# Patient Record
Sex: MALE | Race: WHITE | NOT HISPANIC OR LATINO | URBAN - NONMETROPOLITAN AREA
[De-identification: names, ages, dates, MRNs, and addresses within clinical notes are randomized per-mention and may not be internally consistent; named-entity substitution may affect disease eponyms.]

---

## 2019-12-18 ENCOUNTER — TRANSCRIBE ORDERS (OUTPATIENT)
Dept: ADMINISTRATIVE | Facility: HOSPITAL | Age: 49
End: 2019-12-18

## 2019-12-18 DIAGNOSIS — M47.817 SPONDYLOSIS WITHOUT MYELOPATHY OR RADICULOPATHY, LUMBOSACRAL REGION: Primary | ICD-10-CM

## 2019-12-27 ENCOUNTER — APPOINTMENT (OUTPATIENT)
Dept: CT IMAGING | Facility: HOSPITAL | Age: 49
End: 2019-12-27

## 2020-01-06 ENCOUNTER — HOSPITAL ENCOUNTER (OUTPATIENT)
Dept: CT IMAGING | Facility: HOSPITAL | Age: 50
Discharge: HOME | End: 2020-01-06
Admitting: PAIN MEDICINE

## 2020-01-06 DIAGNOSIS — M47.817 SPONDYLOSIS WITHOUT MYELOPATHY OR RADICULOPATHY, LUMBOSACRAL REGION: ICD-10-CM

## 2020-01-06 PROCEDURE — 72131 CT LUMBAR SPINE W/O DYE: CPT | Performed by: RADIOLOGY

## 2020-01-06 PROCEDURE — 72131 CT LUMBAR SPINE W/O DYE: CPT

## 2020-02-24 ENCOUNTER — OFFICE VISIT (OUTPATIENT)
Dept: CARDIOLOGY | Facility: CLINIC | Age: 50
End: 2020-02-24

## 2020-02-24 VITALS
HEART RATE: 89 BPM | WEIGHT: 115 LBS | HEIGHT: 69 IN | BODY MASS INDEX: 17.03 KG/M2 | SYSTOLIC BLOOD PRESSURE: 168 MMHG | DIASTOLIC BLOOD PRESSURE: 90 MMHG

## 2020-02-24 DIAGNOSIS — R42 DIZZINESS: ICD-10-CM

## 2020-02-24 DIAGNOSIS — R01.1 MURMUR, CARDIAC: ICD-10-CM

## 2020-02-24 DIAGNOSIS — R06.02 SHORTNESS OF BREATH: ICD-10-CM

## 2020-02-24 DIAGNOSIS — F17.200 SMOKING: ICD-10-CM

## 2020-02-24 DIAGNOSIS — R07.89 CHEST PRESSURE: ICD-10-CM

## 2020-02-24 DIAGNOSIS — R09.89 BRUIT OF RIGHT CAROTID ARTERY: ICD-10-CM

## 2020-02-24 DIAGNOSIS — I10 ESSENTIAL HYPERTENSION: Primary | ICD-10-CM

## 2020-02-24 PROBLEM — IMO0001 SMOKING: Status: ACTIVE | Noted: 2020-02-24

## 2020-02-24 PROCEDURE — 93000 ELECTROCARDIOGRAM COMPLETE: CPT | Performed by: INTERNAL MEDICINE

## 2020-02-24 PROCEDURE — 99204 OFFICE O/P NEW MOD 45 MIN: CPT | Performed by: INTERNAL MEDICINE

## 2020-02-24 RX ORDER — LOSARTAN POTASSIUM 25 MG/1
25 TABLET ORAL DAILY
Qty: 30 TABLET | Refills: 3 | Status: SHIPPED | OUTPATIENT
Start: 2020-02-24 | End: 2020-03-05 | Stop reason: DRUGHIGH

## 2020-02-24 RX ORDER — HYDROCODONE BITARTRATE AND ACETAMINOPHEN 5; 325 MG/1; MG/1
1 TABLET ORAL EVERY 6 HOURS PRN
COMMUNITY
End: 2021-09-21 | Stop reason: ALTCHOICE

## 2020-02-24 RX ORDER — ERGOCALCIFEROL 1.25 MG/1
50000 CAPSULE ORAL WEEKLY
COMMUNITY
End: 2022-05-09 | Stop reason: ALTCHOICE

## 2020-02-24 NOTE — PROGRESS NOTES
Chief Complaint   Patient presents with   • Establish Care     HTN   • Hypertension     BP started going up about 2-3 months ago, was 2/4 it was 209/138 at the pain clinic,  reports PCP has started 4-5 medications but due to side effects he has not been able to take them.  Reports amlodipine, metoprolol and Lisinopril caused severe headaches and chlorthalidone made him dizzy.  Pt did not want them listed in allergys, would be willing to try again if you feel necessary.    • Shortness of Breath     with minimal exertion   • Chest Pain     exertional, mid chest, becomes SOB, symptoms resolve after resting.    • Aspirin     does not take a daily aspirin   • Labs     requesting most recent labs   • Cardiac history     none        CARDIAC COMPLAINTS  chest pressure/discomfort, dyspnea and Dizziness      Subjective   Fredrick Henao is a 49 y.o. male came in today for his initial cardiac evaluation.  He has history of hypertension diagnosed about 3 to 4 months ago.  He has been having a lot of problem with his back and neck and was seen at the pain clinic.  His systolic blood pressure was more than 200 and his diastolic was more than 130.  He was seen at your office and multiple medication has been tried which includes amlodipine, metoprolol, lisinopril which apparently caused him to have headaches.  He was also tried on chlorthalidone which made him dizzy.  He is now referred for evaluation of his blood pressure and also the other symptoms he is having.  He apparently has been having chest pain in the form of pressure-like feeling in the mid part of the chest which occurs mostly on exertion and occasionally at rest.  The symptoms do get better when he takes some rest.  He also has been noticing shortness of breath which initially was occurring on moderate exertion now occurring and minimal exertion.  It was not associated with any orthopnea or cough.  He also has been having episodes of dizziness and lightheadedness and  occasional vision changes.  The chest pressure is not associated with any nausea or vomiting.  It is not associated with any palpitation or diaphoresis.  He did undergo some lab work and found to have slightly low folic acid level.  His blood count was normal with a normal MCV.  His TSH was normal with a normal T4.  His cholesterol is normal at 122 with a triglyceride of 149 and LDL of 65.  He used to be a 2 pack cigarettes a day and is now cutting down to about a pack a pack and a half a day.  His father  in his 50's with cancer and ischemic heart disease.  His mother  earlier and she did have diabetes.  1 of her sister  of a cancer the other sister has premature coronary artery disease.    History reviewed. No pertinent surgical history.    Current Outpatient Medications   Medication Sig Dispense Refill   • Fluticasone Furoate-Vilanterol (BREO ELLIPTA) 100-25 MCG/INH inhaler Inhale 1 puff Daily.     • HYDROcodone-acetaminophen (NORCO) 5-325 MG per tablet Take 1 tablet by mouth Every 6 (Six) Hours As Needed.     • Umeclidinium Bromide (INCRUSE ELLIPTA) 62.5 MCG/INH aerosol powder  Inhale.     • vitamin D (ERGOCALCIFEROL) 1.25 MG (38622 UT) capsule capsule Take 50,000 Units by mouth 1 (One) Time Per Week.     • losartan (COZAAR) 25 MG tablet Take 1 tablet by mouth Daily. 30 tablet 3   • nicotine (NICOTROL) 10 MG inhaler Inhale 1 puff As Needed for Smoking Cessation. 168 inhaler 4     No current facility-administered medications for this visit.            ALLERGIES:  Other    Past Medical History:   Diagnosis Date   • COPD (chronic obstructive pulmonary disease) (CMS/[facility])    • Heart murmur    • History of ear surgery    • Hypertension    • Leg fracture     s/p steel daniela, then later removed       Social History     Tobacco Use   Smoking Status Current Every Day Smoker   • Packs/day: 1.50   • Years: 38.00   • Pack years: 57.00   • Types: Cigarettes   Smokeless Tobacco Never Used          Family History  "  Problem Relation Age of Onset   • Diabetes Mother    • Cancer Father    • Heart attack Father         MI at early age,  at 55 with cancer, MI prior to t   • Heart attack Sister         MI at 40, s/p stenting   • No Known Problems Brother    • Heart disease Sister         open heart due to \" hole in her heart   • Cancer Sister    • No Known Problems Sister    • No Known Problems Sister    • No Known Problems Daughter    • No Known Problems Daughter    • No Known Problems Son        Review of Systems   Constitution: Positive for malaise/fatigue. Negative for decreased appetite.   HENT: Negative for congestion and sore throat.    Eyes: Negative for blurred vision.   Cardiovascular: Positive for chest pain and dyspnea on exertion.   Respiratory: Positive for shortness of breath. Negative for snoring.    Endocrine: Negative for cold intolerance and heat intolerance.   Hematologic/Lymphatic: Negative for adenopathy. Does not bruise/bleed easily.   Skin: Negative for itching, nail changes and skin cancer.   Musculoskeletal: Positive for back pain. Negative for arthritis and myalgias.   Gastrointestinal: Negative for abdominal pain, dysphagia and heartburn.   Genitourinary: Negative for bladder incontinence and frequency.   Neurological: Positive for dizziness and light-headedness. Negative for seizures and vertigo.   Psychiatric/Behavioral: Negative for altered mental status.   Allergic/Immunologic: Negative for environmental allergies and hives.       Diabetes- No  Thyroid- normal    Objective     /90 (BP Location: Left arm)   Pulse 89   Ht 175.3 cm (69\")   Wt 52.2 kg (115 lb)   BMI 16.98 kg/m²     Physical Exam   Constitutional: He is oriented to person, place, and time. He appears well-developed and well-nourished.   HENT:   Head: Normocephalic.   Nose: Nose normal.   Eyes: Pupils are equal, round, and reactive to light. EOM are normal.   Neck: Normal range of motion. Neck supple.   Cardiovascular: " Normal rate, regular rhythm, S1 normal and S2 normal.   Murmur heard.  Pulmonary/Chest: Effort normal and breath sounds normal.   Abdominal: Soft. Bowel sounds are normal.   Musculoskeletal: Normal range of motion. He exhibits no edema.   Neurological: He is alert and oriented to person, place, and time.   Skin: Skin is warm and dry.   Psychiatric: He has a normal mood and affect.         ECG 12 Lead  Date/Time: 2/24/2020 2:37 PM  Performed by: Faisal Meza MD  Authorized by: Faisal Meza MD   Previous ECG: no previous ECG available  Rhythm: sinus rhythm  Rate: normal  QRS axis: normal  Other findings: left ventricular hypertrophy and early repolarization    Clinical impression: non-specific ECG              Assessment/Plan   Patient's Body mass index is 16.98 kg/m². BMI is within normal parameters. No follow-up required..     Fredrick was seen today for establish care, hypertension, shortness of breath, chest pain, aspirin, labs and cardiac history.    Diagnoses and all orders for this visit:    Essential hypertension  -     losartan (COZAAR) 25 MG tablet; Take 1 tablet by mouth Daily.    Shortness of breath  -     Adult Transthoracic Echo Complete W/ Cont if Necessary Per Protocol; Future    Chest pressure  -     Stress Test With Myocardial Perfusion One Day; Future    Smoking  -     nicotine (NICOTROL) 10 MG inhaler; Inhale 1 puff As Needed for Smoking Cessation.    Murmur, cardiac    Bruit of right carotid artery  -     US Carotid Bilateral; Future    Dizziness  -     US Carotid Bilateral; Future    At baseline his heart rate is upper limit of normal.  His blood pressure is elevated.  His EKG done today shows normal sinus rhythm, LVH with nonspecific ST changes.  His clinical examination reveals a BMI of 17.  He apparently has been maintaining the same weight for a long time.  His cardiovascular examination is unremarkable other than slightly loud second heart sound and a short systolic murmur at  the mitral area.  He does have a carotid bruit on the right side.    Regarding his blood pressure, he has already tried ACE inhibitor's, calcium channel blockers as well as the beta-blockers and a diuretic.  I will try with an ARB.  I started him on a low-dose of Cozaar at 25 mg once a day.  If he is tolerating it well and then will increase the dose.    Regarding the chest tightness, he does have multiple risk factor for coronary artery disease.  It also could be related to his elevated blood pressure.  I scheduled him to undergo a stress test.  Since he is not able to walk much secondary to his back problem, will do it as a Lexiscan Cardiolite.    Regarding his shortness of breath, it could be related to his smoking but I cannot rule out cardiac.  I scheduled him to have an echocardiogram to evaluate the LV function, valvular structures as well as the PA pressure.    Regarding his smoking history, I had a long talk with him about the increased risk of developing malignancy, vascular disease as well as COPD.  He is willing to try to quit smoking.  I gave him prescription for Nicotrol inhalers and also gave him papers regarding smoking cessation    Regarding the bruit in the right carotid, I scheduled him to undergo a carotid ultrasound.    The dizziness he has could be related to the blood pressure but also could be related to the carotid abnormality.  Based on the ultrasound, further recommendation will be made.                   Electronically signed by Faisal Meza MD February 24, 2020 2:23 PM

## 2020-02-24 NOTE — PROGRESS NOTES
Fredrick Henao  reports that he has been smoking cigarettes. He has a 57.00 pack-year smoking history. He has never used smokeless tobacco.. I have educated him on the risk of diseases from using tobacco products such as cancer, COPD and heart diease.     I advised him to quit and he is willing to quit. We have discussed the following method/s for tobacco cessation:  Education Material Counseling Prescription Medicaiton.  Together we have set a quit date for 1 week from today.  He will follow up with me in 6 months or sooner to check on his progress.    I spent 3  minutes counseling the patient.

## 2020-03-03 ENCOUNTER — HOSPITAL ENCOUNTER (OUTPATIENT)
Dept: CARDIOLOGY | Facility: HOSPITAL | Age: 50
Discharge: HOME | End: 2020-03-03

## 2020-03-03 ENCOUNTER — HOSPITAL ENCOUNTER (OUTPATIENT)
Dept: CARDIOLOGY | Facility: HOSPITAL | Age: 50
End: 2020-03-03

## 2020-03-03 ENCOUNTER — APPOINTMENT (OUTPATIENT)
Dept: CARDIOLOGY | Facility: HOSPITAL | Age: 50
End: 2020-03-03

## 2020-03-03 DIAGNOSIS — R42 DIZZINESS: ICD-10-CM

## 2020-03-03 DIAGNOSIS — R09.89 BRUIT OF RIGHT CAROTID ARTERY: ICD-10-CM

## 2020-03-03 DIAGNOSIS — R06.02 SHORTNESS OF BREATH: ICD-10-CM

## 2020-03-03 LAB
BH CV ECHO MEAS - ACS: 2.2 CM
BH CV ECHO MEAS - AO MAX PG: 3.9 MMHG
BH CV ECHO MEAS - AO MEAN PG: 2 MMHG
BH CV ECHO MEAS - AO ROOT AREA (BSA CORRECTED): 1.9
BH CV ECHO MEAS - AO ROOT AREA: 7.3 CM^2
BH CV ECHO MEAS - AO ROOT DIAM: 3.1 CM
BH CV ECHO MEAS - AO V2 MAX: 99 CM/SEC
BH CV ECHO MEAS - AO V2 MEAN: 73.7 CM/SEC
BH CV ECHO MEAS - AO V2 VTI: 24 CM
BH CV ECHO MEAS - BSA(HAYCOCK): 1.6 M^2
BH CV ECHO MEAS - BSA: 1.6 M^2
BH CV ECHO MEAS - BZI_BMI: 17 KILOGRAMS/M^2
BH CV ECHO MEAS - BZI_METRIC_HEIGHT: 175.3 CM
BH CV ECHO MEAS - BZI_METRIC_WEIGHT: 52.2 KG
BH CV ECHO MEAS - EDV(CUBED): 65 ML
BH CV ECHO MEAS - EDV(MOD-SP4): 86.6 ML
BH CV ECHO MEAS - EDV(TEICH): 70.8 ML
BH CV ECHO MEAS - EF(CUBED): 63.6 %
BH CV ECHO MEAS - EF(MOD-SP4): 56 %
BH CV ECHO MEAS - EF(TEICH): 55.7 %
BH CV ECHO MEAS - ESV(CUBED): 23.6 ML
BH CV ECHO MEAS - ESV(MOD-SP4): 38.1 ML
BH CV ECHO MEAS - ESV(TEICH): 31.4 ML
BH CV ECHO MEAS - FS: 28.6 %
BH CV ECHO MEAS - IVS/LVPW: 0.61
BH CV ECHO MEAS - IVSD: 1 CM
BH CV ECHO MEAS - LA DIMENSION: 2.8 CM
BH CV ECHO MEAS - LA/AO: 0.92
BH CV ECHO MEAS - LV DIASTOLIC VOL/BSA (35-75): 53 ML/M^2
BH CV ECHO MEAS - LV IVRT: 0.11 SEC
BH CV ECHO MEAS - LV MASS(C)D: 192.2 GRAMS
BH CV ECHO MEAS - LV MASS(C)DI: 117.7 GRAMS/M^2
BH CV ECHO MEAS - LV SYSTOLIC VOL/BSA (12-30): 23.3 ML/M^2
BH CV ECHO MEAS - LVIDD: 4 CM
BH CV ECHO MEAS - LVIDS: 2.9 CM
BH CV ECHO MEAS - LVLD AP4: 7.5 CM
BH CV ECHO MEAS - LVLS AP4: 6.2 CM
BH CV ECHO MEAS - LVOT AREA (M): 2.8 CM^2
BH CV ECHO MEAS - LVOT AREA: 2.8 CM^2
BH CV ECHO MEAS - LVOT DIAM: 1.9 CM
BH CV ECHO MEAS - LVPWD: 1.6 CM
BH CV ECHO MEAS - MV A MAX VEL: 67.3 CM/SEC
BH CV ECHO MEAS - MV DEC SLOPE: 400.5 CM/SEC^2
BH CV ECHO MEAS - MV E MAX VEL: 88.3 CM/SEC
BH CV ECHO MEAS - MV E/A: 1.3
BH CV ECHO MEAS - MV MAX PG: 3.6 MMHG
BH CV ECHO MEAS - MV MEAN PG: 2 MMHG
BH CV ECHO MEAS - MV P1/2T MAX VEL: 100 CM/SEC
BH CV ECHO MEAS - MV P1/2T: 73.1 MSEC
BH CV ECHO MEAS - MV V2 MAX: 94.7 CM/SEC
BH CV ECHO MEAS - MV V2 MEAN: 63.3 CM/SEC
BH CV ECHO MEAS - MV V2 VTI: 26.4 CM
BH CV ECHO MEAS - MVA P1/2T LCG: 2.2 CM^2
BH CV ECHO MEAS - MVA(P1/2T): 3 CM^2
BH CV ECHO MEAS - RVDD: 2.1 CM
BH CV ECHO MEAS - SI(AO): 107.4 ML/M^2
BH CV ECHO MEAS - SI(CUBED): 25.3 ML/M^2
BH CV ECHO MEAS - SI(MOD-SP4): 29.7 ML/M^2
BH CV ECHO MEAS - SI(TEICH): 24.2 ML/M^2
BH CV ECHO MEAS - SV(AO): 175.3 ML
BH CV ECHO MEAS - SV(CUBED): 41.3 ML
BH CV ECHO MEAS - SV(MOD-SP4): 48.5 ML
BH CV ECHO MEAS - SV(TEICH): 39.4 ML
MAXIMAL PREDICTED HEART RATE: 173 BPM
STRESS TARGET HR: 147 BPM

## 2020-03-03 PROCEDURE — 93306 TTE W/DOPPLER COMPLETE: CPT

## 2020-03-03 PROCEDURE — 93880 EXTRACRANIAL BILAT STUDY: CPT

## 2020-03-03 PROCEDURE — 93306 TTE W/DOPPLER COMPLETE: CPT | Performed by: INTERNAL MEDICINE

## 2020-03-03 PROCEDURE — 93880 EXTRACRANIAL BILAT STUDY: CPT | Performed by: RADIOLOGY

## 2020-03-04 ENCOUNTER — TELEPHONE (OUTPATIENT)
Dept: CARDIOLOGY | Facility: CLINIC | Age: 50
End: 2020-03-04

## 2020-03-04 NOTE — TELEPHONE ENCOUNTER
Pt called, BP is still running high, 176/126, pulse 83-95. Losartan started at 25 mg daily to see how he tolerates it at his consult. Reports no problems with the medication just not controlling the BP.

## 2020-03-05 RX ORDER — LOSARTAN POTASSIUM 50 MG/1
50 TABLET ORAL 2 TIMES DAILY
Qty: 180 TABLET | Refills: 3 | Status: SHIPPED | OUTPATIENT
Start: 2020-03-05 | End: 2020-03-16 | Stop reason: ALTCHOICE

## 2020-03-05 NOTE — TELEPHONE ENCOUNTER
Pt aware to take Losartan 50 mg BID, continue to monitor BP and call if still not controlled. Sending script to [facility].

## 2020-03-05 NOTE — TELEPHONE ENCOUNTER
Pt's wife called, pt went to the ER with chest pain last night. Reports BP was 186/126. Gave him Nitro and told him to call us. I have advised his wife to increase the Losartan to 50 mg daily. Is there any other changes you want to make? ER report in chart for you to review.

## 2020-03-10 ENCOUNTER — TELEPHONE (OUTPATIENT)
Dept: CARDIOLOGY | Facility: CLINIC | Age: 50
End: 2020-03-10

## 2020-03-10 NOTE — TELEPHONE ENCOUNTER
Pt's wife called, reports BP still running high. 170's/100 at times, didn't report pulse.  Pt had his stress and echo today. Advised her that I would let you know so you could address when reading stress and echo.     Currently taking losartan 50 mg BID.

## 2020-03-16 RX ORDER — OLMESARTAN MEDOXOMIL 40 MG/1
40 TABLET ORAL DAILY
Qty: 30 TABLET | Refills: 3 | Status: SHIPPED | OUTPATIENT
Start: 2020-03-16 | End: 2020-04-16 | Stop reason: ALTCHOICE

## 2020-03-16 NOTE — TELEPHONE ENCOUNTER
I called Sharon to check on his stress that pt assured me he went to on the 10th and walked on the treadmill. She said pt was not there on the 10th. I called his wife back to ask about it, she was certain he was there for the stress. I again called our [facility] back, spoke with  and Heena, they both assured me that he was not there on the 10th. He was there on the 3rd and had to be rescheduled due to being a Riri and having had caffine. I have left a message for his wife to return my call. Regardless of stress or not his BP is still elevated per wife. 190/119 at times.  Pulse not reported. Currently on losartan 50 mg twice a day, can we adjust or add something until we can figure out the stress test?

## 2020-03-16 NOTE — TELEPHONE ENCOUNTER
Pt's wife Dulce aware of medication changes to stop Losartan and start Benicar 40 mg daily (Clarified instructions with Dr Meza, Benicar 40 mg daily).. Also advised her we have still been unable to obtain any results of the stress she and pt reported him having on 3/10,  that after talking again to Jeanne at our [facility], there is nothing showing that he was ever there on 3/10 or had the stress. Advised her to call the [facility] at [phone] to discuss with them. She wanted medication sent to [facility]. Advised her to call back if BP still not controlled.

## 2020-03-17 ENCOUNTER — PRIOR AUTHORIZATION (OUTPATIENT)
Dept: CARDIOLOGY | Facility: CLINIC | Age: 50
End: 2020-03-17

## 2020-04-16 ENCOUNTER — TELEPHONE (OUTPATIENT)
Dept: CARDIOLOGY | Facility: CLINIC | Age: 50
End: 2020-04-16

## 2020-04-16 RX ORDER — CANDESARTAN 32 MG/1
32 TABLET ORAL DAILY
Qty: 30 TABLET | Refills: 3 | Status: SHIPPED | OUTPATIENT
Start: 2020-04-16 | End: 2020-08-12 | Stop reason: ALTCHOICE

## 2020-04-16 NOTE — TELEPHONE ENCOUNTER
Noticed in chart denial for Benicar due to not a preferred drug for health plan. Preferred medications Candesartan, irbesartan, telmisartan, and valsartan.    Do you want to change to one of the preferred medications, if so what medication and dose? Thank you

## 2020-04-16 NOTE — TELEPHONE ENCOUNTER
PATIENT CALLED IN STATING THAT THE MEDICATION PRIOR AUTH WAS NOT COMPLETE.  LOOKS AS THOUGH [facility] HAD FAXED IN FORMS.

## 2020-04-16 NOTE — TELEPHONE ENCOUNTER
Patient made aware to stop Benicar, will start Candesartan 32mg daily, patient verbalized understanding.

## 2020-06-05 RX ORDER — OLMESARTAN MEDOXOMIL 40 MG/1
TABLET ORAL
Qty: 30 TABLET | Refills: 2 | OUTPATIENT
Start: 2020-06-05

## 2020-07-14 RX ORDER — OLMESARTAN MEDOXOMIL 40 MG/1
TABLET ORAL
Qty: 30 TABLET | OUTPATIENT
Start: 2020-07-14

## 2020-08-11 ENCOUNTER — TELEPHONE (OUTPATIENT)
Dept: CARDIOLOGY | Facility: CLINIC | Age: 50
End: 2020-08-11

## 2020-08-11 RX ORDER — OLMESARTAN MEDOXOMIL 40 MG/1
TABLET ORAL
Qty: 30 TABLET | Refills: 0 | OUTPATIENT
Start: 2020-08-11

## 2020-08-12 RX ORDER — OLMESARTAN MEDOXOMIL 40 MG/1
40 TABLET ORAL DAILY
Qty: 90 TABLET | Refills: 3 | Status: SHIPPED | OUTPATIENT
Start: 2020-08-12 | End: 2020-08-13 | Stop reason: SDUPTHER

## 2020-08-13 RX ORDER — OLMESARTAN MEDOXOMIL 40 MG/1
TABLET ORAL
Qty: 90 TABLET | Refills: 3
Start: 2020-08-13 | End: 2021-09-10 | Stop reason: SDUPTHER

## 2021-08-27 RX ORDER — OLMESARTAN MEDOXOMIL 40 MG/1
TABLET ORAL
Qty: 30 TABLET | Refills: 3 | OUTPATIENT
Start: 2021-08-27

## 2021-08-31 RX ORDER — OLMESARTAN MEDOXOMIL 40 MG/1
TABLET ORAL
Qty: 30 TABLET | OUTPATIENT
Start: 2021-08-31

## 2021-09-07 DIAGNOSIS — I10 ESSENTIAL HYPERTENSION: Primary | ICD-10-CM

## 2021-09-07 RX ORDER — OLMESARTAN MEDOXOMIL 40 MG/1
40 TABLET ORAL DAILY
Qty: 30 TABLET | Refills: 0
Start: 2021-09-07

## 2021-09-10 NOTE — TELEPHONE ENCOUNTER
Script for olmesartan 40 mg daily pended to send to [facility] to get patient to appointment on 09/21/21.

## 2021-09-13 RX ORDER — OLMESARTAN MEDOXOMIL 40 MG/1
40 TABLET ORAL DAILY
Qty: 14 TABLET | Refills: 0 | Status: SHIPPED | OUTPATIENT
Start: 2021-09-13 | End: 2021-09-21 | Stop reason: SDUPTHER

## 2021-09-21 ENCOUNTER — OFFICE VISIT (OUTPATIENT)
Dept: CARDIOLOGY | Facility: CLINIC | Age: 51
End: 2021-09-21

## 2021-09-21 VITALS
TEMPERATURE: 98.4 F | BODY MASS INDEX: 16.74 KG/M2 | SYSTOLIC BLOOD PRESSURE: 124 MMHG | WEIGHT: 113 LBS | HEIGHT: 69 IN | HEART RATE: 80 BPM | DIASTOLIC BLOOD PRESSURE: 70 MMHG

## 2021-09-21 DIAGNOSIS — I10 ESSENTIAL HYPERTENSION: ICD-10-CM

## 2021-09-21 DIAGNOSIS — I20.8 ANGINAL EQUIVALENT (HCC): ICD-10-CM

## 2021-09-21 DIAGNOSIS — Z82.49 FAMILY HISTORY OF ISCHEMIC HEART DISEASE (IHD): ICD-10-CM

## 2021-09-21 DIAGNOSIS — J44.9 COPD MIXED TYPE (HCC): ICD-10-CM

## 2021-09-21 DIAGNOSIS — R06.02 SHORTNESS OF BREATH: ICD-10-CM

## 2021-09-21 DIAGNOSIS — Z72.0 TOBACCO ABUSE: ICD-10-CM

## 2021-09-21 DIAGNOSIS — Z79.899 LONG-TERM USE OF HIGH-RISK MEDICATION: ICD-10-CM

## 2021-09-21 DIAGNOSIS — E55.9 VITAMIN D DEFICIENCY: Primary | ICD-10-CM

## 2021-09-21 PROCEDURE — 99214 OFFICE O/P EST MOD 30 MIN: CPT | Performed by: NURSE PRACTITIONER

## 2021-09-21 RX ORDER — GABAPENTIN 300 MG/1
300 CAPSULE ORAL 2 TIMES DAILY
COMMUNITY
End: 2022-05-09 | Stop reason: ALTCHOICE

## 2021-09-21 RX ORDER — OLMESARTAN MEDOXOMIL 40 MG/1
40 TABLET ORAL DAILY
Qty: 30 TABLET | Refills: 1 | Status: SHIPPED | OUTPATIENT
Start: 2021-09-21 | End: 2021-10-22

## 2021-09-21 RX ORDER — HYDROCODONE BITARTRATE AND ACETAMINOPHEN 7.5; 325 MG/1; MG/1
1 TABLET ORAL 3 TIMES DAILY
COMMUNITY
End: 2022-05-09 | Stop reason: ALTCHOICE

## 2021-09-21 NOTE — PROGRESS NOTES
Chief Complaint   Patient presents with   • Follow-up     For cardiac management. Patient is not on aspirin. Last lab work unknown, states that he probably has not had since 2019. States that he does have shortness of breath with walking.    • Med Refill     Needs refills on cardiac medication. 90 day supplies to [facility]. Went over medications verbally.        Cardiac Complaints  dyspnea      Subjective   Fredrick Henao is a 51 y.o. male with HTN, COPD, and murmur. He was sent to PCP about 3-4 months prior to his consult and his BP was elevated at more than 200 SBP and . He was advised at consult to have stress testing, echo,  and carotid US.  Carotid US showed no lesions bilaterally, echo showed normal LV function with no significant valve concerns and stress was not completed. It appears after consult, he had a great deal of issues with HTN and medication was increased and changed multiple times, lastly on candesartan 32mg daily, then later changed to benicar.    He returns today for follow up and denies any new concerns. He does have SOA with exertion, no worse than prior. No chest pain, dizziness, syncope, palpitations, or falls reported.  He can not recall why he did not complete his stress testing. Labs have not been done for sometime, probably since 2019.  Refills requested.  He unfortunately still smokes despite concerns.          Cardiac History  Past Surgical History:   Procedure Laterality Date   • ECHO - CONVERTED  03/03/2020    EF 60%. Mild MR.   • US CAROTID UNILATERAL  03/03/2020    No sig lesions       Current Outpatient Medications   Medication Sig Dispense Refill   • Fluticasone Furoate-Vilanterol (BREO ELLIPTA) 100-25 MCG/INH inhaler Inhale 1 puff Daily.     • gabapentin (NEURONTIN) 300 MG capsule Take 300 mg by mouth 2 (two) times a day.     • HYDROcodone-acetaminophen (NORCO) 7.5-325 MG per tablet Take 1 tablet by mouth 3 (Three) Times a Day.     • olmesartan (BENICAR) 40 MG  "tablet Take 1 tablet by mouth Daily. 30 tablet 1   • Umeclidinium Bromide (INCRUSE ELLIPTA) 62.5 MCG/INH aerosol powder  Inhale.     • vitamin D (ERGOCALCIFEROL) 1.25 MG (78878 UT) capsule capsule Take 50,000 Units by mouth 1 (One) Time Per Week.       No current facility-administered medications for this visit.       Other    Past Medical History:   Diagnosis Date   • COPD (chronic obstructive pulmonary disease) (CMS/[facility])    • Heart murmur    • History of ear surgery    • Hypertension    • Leg fracture     s/p steel daniela, then later removed       Social History     Socioeconomic History   • Marital status:      Spouse name: Not on file   • Number of children: Not on file   • Years of education: Not on file   • Highest education level: Not on file   Tobacco Use   • Smoking status: Current Every Day Smoker     Packs/day: 1.00     Years: 38.00     Pack years: 38.00     Types: Cigarettes   • Smokeless tobacco: Never Used   Vaping Use   • Vaping Use: Never used   Substance and Sexual Activity   • Alcohol use: Never   • Drug use: Never       Family History   Problem Relation Age of Onset   • Diabetes Mother    • Cancer Father    • Heart attack Father         MI at early age,  at 55 with cancer, MI prior to t   • Heart attack Sister         MI at 40, s/p stenting   • No Known Problems Brother    • Heart disease Sister         open heart due to \" hole in her heart   • Cancer Sister    • No Known Problems Sister    • No Known Problems Sister    • No Known Problems Daughter    • No Known Problems Daughter    • No Known Problems Son        Review of Systems   Constitutional: Negative for malaise/fatigue and night sweats.   Cardiovascular: Positive for dyspnea on exertion. Negative for chest pain, claudication, irregular heartbeat, leg swelling, near-syncope, orthopnea, palpitations and syncope.   Respiratory: Positive for shortness of breath. Negative for cough and wheezing.    Musculoskeletal: Negative for back " "pain, joint pain and stiffness.   Gastrointestinal: Negative for anorexia, heartburn, melena and nausea.   Genitourinary: Negative for dysuria, hematuria, hesitancy and nocturia.   Neurological: Negative for dizziness, light-headedness and loss of balance.   Psychiatric/Behavioral: Negative for depression and memory loss. The patient is not nervous/anxious.            Objective     /70 (BP Location: Left arm)   Pulse 80   Temp 98.4 °F (36.9 °C)   Ht 175.3 cm (69.02\")   Wt 51.3 kg (113 lb)   BMI 16.68 kg/m²     Constitutional:       Appearance: Not in distress.   Eyes:      Pupils: Pupils are equal, round, and reactive to light.   HENT:      Nose: Nose normal.   Pulmonary:      Effort: Pulmonary effort is normal.      Breath sounds: Normal breath sounds.   Cardiovascular:      PMI at left midclavicular line. Normal rate. Regular rhythm.      Murmurs: There is a systolic murmur.   Abdominal:      Palpations: Abdomen is soft.   Musculoskeletal: Normal range of motion.      Cervical back: Normal range of motion and neck supple. Skin:     General: Skin is warm and dry.   Neurological:      Mental Status: Oriented to person, place and time.         Procedures    Assessment/Plan     HTN:  Blood pressure stable. He has tolerated his benicar well. He states with his medication it has been well managed. Limited sodium advised.     Cardiac status:  Stable. He does have SOA but no worse than prior. He did complete an echo, but for reasons unknown he did not complete stress. We will advise once again for this to be done as he has a very strong family history of IHD, risk factors for IHD, and continued tobacco abuse.  Stress will be advised as lexiscan as he has COPD and does not have good exercise tolerance.  More recommendations to follow.    COPD mixed type:  Followed by pulmonary.  He is on inhaler therapy, he admits it has been awhile since he has seen them, compliance urged.     Tobacco abuse:  Still smoking " despite concerns. He is not ready to quit at present.     Lab order provided as it has been over 2 years since he had bloodwork.    Refills per request.    6 month follow up advised or sooner if needed.    BMI remains low at 16.68, he states this has been low weight his whole life. Adequate protein intake advised.        Problems Addressed this Visit        Cardiac and Vasculature    Essential hypertension    Relevant Medications    olmesartan (BENICAR) 40 MG tablet    Other Relevant Orders    CBC (No Diff)    Comprehensive Metabolic Panel    TSH    Lipid Panel       Pulmonary and Pneumonias    Shortness of breath    Relevant Orders    Stress Test With Myocardial Perfusion One Day      Other Visit Diagnoses     Vitamin D deficiency    -  Primary    Relevant Orders    Comprehensive Metabolic Panel    TSH    Lipid Panel    Vitamin D 25 Hydroxy    COPD mixed type (CMS/HCC)        Relevant Orders    Stress Test With Myocardial Perfusion One Day    Long-term use of high-risk medication        Relevant Orders    CBC (No Diff)    Comprehensive Metabolic Panel    TSH    Lipid Panel    Anginal equivalent (CMS/HCC)        Relevant Orders    Stress Test With Myocardial Perfusion One Day    Family history of ischemic heart disease (IHD)        Relevant Orders    Stress Test With Myocardial Perfusion One Day    Tobacco abuse        Relevant Orders    Stress Test With Myocardial Perfusion One Day      Diagnoses       Codes Comments    Vitamin D deficiency    -  Primary ICD-10-CM: E55.9  ICD-9-CM: 268.9     Essential hypertension     ICD-10-CM: I10  ICD-9-CM: 401.9     COPD mixed type (CMS/HCC)     ICD-10-CM: J44.9  ICD-9-CM: 496     Long-term use of high-risk medication     ICD-10-CM: Z79.899  ICD-9-CM: V58.69     Shortness of breath     ICD-10-CM: R06.02  ICD-9-CM: 786.05     Anginal equivalent (CMS/HCC)     ICD-10-CM: I20.8  ICD-9-CM: 413.9     Family history of ischemic heart disease (IHD)     ICD-10-CM: Z82.49  ICD-9-CM:  V17.3     Tobacco abuse     ICD-10-CM: Z72.0  ICD-9-CM: 305.1           Patient's Body mass index is 16.68 kg/m². indicating that he is underweight. Good cardiac diet with adequate protein intake advised.     Fredrick Henao  reports that he has been smoking cigarettes. He has a 38.00 pack-year smoking history. He has never used smokeless tobacco. Not ready to quit at present.              Electronically signed by TRAM Jones September 21, 2021 18:33 EDT

## 2021-09-22 ENCOUNTER — APPOINTMENT (OUTPATIENT)
Dept: CARDIOLOGY | Facility: HOSPITAL | Age: 51
End: 2021-09-22

## 2021-09-23 ENCOUNTER — TELEPHONE (OUTPATIENT)
Dept: CARDIOLOGY | Facility: CLINIC | Age: 51
End: 2021-09-23

## 2021-09-23 NOTE — TELEPHONE ENCOUNTER
Patient's wife, Dulce contacted the [facility] and states patient does not want IV for stress test.  He is scheduled for nuclear stress as Lexiscan.  She told [facility] that he would not do stress if it involved IV.

## 2021-09-23 NOTE — TELEPHONE ENCOUNTER
Patient's wife has called [facility] back this afternoon checking on recommendations.  She told [facility] he is willing to do a regular stress test if you want to place order.

## 2021-09-24 ENCOUNTER — APPOINTMENT (OUTPATIENT)
Dept: CARDIOLOGY | Facility: HOSPITAL | Age: 51
End: 2021-09-24

## 2021-09-24 ENCOUNTER — TELEPHONE (OUTPATIENT)
Dept: CARDIOLOGY | Facility: CLINIC | Age: 51
End: 2021-09-24

## 2021-09-24 DIAGNOSIS — R07.89 CHEST PRESSURE: Primary | ICD-10-CM

## 2021-09-24 DIAGNOSIS — I10 ESSENTIAL HYPERTENSION: ICD-10-CM

## 2021-09-24 DIAGNOSIS — R06.02 SHORTNESS OF BREATH: ICD-10-CM

## 2021-09-27 ENCOUNTER — HOSPITAL ENCOUNTER (OUTPATIENT)
Dept: CARDIOLOGY | Facility: HOSPITAL | Age: 51
Discharge: HOME | End: 2021-09-27

## 2021-09-27 ENCOUNTER — LAB (OUTPATIENT)
Dept: LAB | Facility: HOSPITAL | Age: 51
End: 2021-09-27

## 2021-09-27 DIAGNOSIS — E55.9 VITAMIN D DEFICIENCY: ICD-10-CM

## 2021-09-27 DIAGNOSIS — Z79.899 LONG-TERM USE OF HIGH-RISK MEDICATION: ICD-10-CM

## 2021-09-27 DIAGNOSIS — I10 ESSENTIAL HYPERTENSION: ICD-10-CM

## 2021-09-27 DIAGNOSIS — R07.89 CHEST PRESSURE: ICD-10-CM

## 2021-09-27 DIAGNOSIS — R06.02 SHORTNESS OF BREATH: ICD-10-CM

## 2021-09-27 LAB
ALBUMIN SERPL-MCNC: 4.13 G/DL (ref 3.5–5.2)
ALBUMIN/GLOB SERPL: 1.5 G/DL
ALP SERPL-CCNC: 166 U/L (ref 39–117)
ALT SERPL W P-5'-P-CCNC: 10 U/L (ref 1–41)
ANION GAP SERPL CALCULATED.3IONS-SCNC: 14.1 MMOL/L (ref 5–15)
AST SERPL-CCNC: 17 U/L (ref 1–40)
BH CV STRESS RECOVERY BP: NORMAL MMHG
BH CV STRESS RECOVERY HR: 89 BPM
BILIRUB SERPL-MCNC: 0.3 MG/DL (ref 0–1.2)
BUN SERPL-MCNC: 4 MG/DL (ref 6–20)
BUN/CREAT SERPL: 5.2 (ref 7–25)
CALCIUM SPEC-SCNC: 9.4 MG/DL (ref 8.6–10.5)
CHLORIDE SERPL-SCNC: 101 MMOL/L (ref 98–107)
CHOLEST SERPL-MCNC: 135 MG/DL (ref 0–200)
CO2 SERPL-SCNC: 23.9 MMOL/L (ref 22–29)
CREAT SERPL-MCNC: 0.77 MG/DL (ref 0.76–1.27)
DEPRECATED RDW RBC AUTO: 46.4 FL (ref 37–54)
ERYTHROCYTE [DISTWIDTH] IN BLOOD BY AUTOMATED COUNT: 13.7 % (ref 12.3–15.4)
GFR SERPL CREATININE-BSD FRML MDRD: 107 ML/MIN/1.73
GLOBULIN UR ELPH-MCNC: 2.7 GM/DL
GLUCOSE SERPL-MCNC: 81 MG/DL (ref 65–99)
HCT VFR BLD AUTO: 42.5 % (ref 37.5–51)
HDLC SERPL-MCNC: 34 MG/DL (ref 40–60)
HGB BLD-MCNC: 13.5 G/DL (ref 13–17.7)
LDLC SERPL CALC-MCNC: 74 MG/DL (ref 0–100)
LDLC/HDLC SERPL: 2.06 {RATIO}
MAXIMAL PREDICTED HEART RATE: 171 BPM
MCH RBC QN AUTO: 29.5 PG (ref 26.6–33)
MCHC RBC AUTO-ENTMCNC: 31.8 G/DL (ref 31.5–35.7)
MCV RBC AUTO: 92.8 FL (ref 79–97)
PERCENT MAX PREDICTED HR: 79.53 %
PLATELET # BLD AUTO: 420 10*3/MM3 (ref 140–450)
PMV BLD AUTO: 9.5 FL (ref 6–12)
POTASSIUM SERPL-SCNC: 4.5 MMOL/L (ref 3.5–5.2)
PROT SERPL-MCNC: 6.8 G/DL (ref 6–8.5)
RBC # BLD AUTO: 4.58 10*6/MM3 (ref 4.14–5.8)
SODIUM SERPL-SCNC: 139 MMOL/L (ref 136–145)
STRESS BASELINE BP: NORMAL MMHG
STRESS BASELINE HR: 95 BPM
STRESS PERCENT HR: 94 %
STRESS POST ESTIMATED WORKLOAD: 7 METS
STRESS POST EXERCISE DUR MIN: 5 MIN
STRESS POST EXERCISE DUR SEC: 40 SEC
STRESS POST PEAK BP: NORMAL MMHG
STRESS POST PEAK HR: 136 BPM
STRESS TARGET HR: 145 BPM
TRIGL SERPL-MCNC: 155 MG/DL (ref 0–150)
TSH SERPL DL<=0.05 MIU/L-ACNC: 1.3 UIU/ML (ref 0.27–4.2)
VLDLC SERPL-MCNC: 27 MG/DL (ref 5–40)
WBC # BLD AUTO: 6.43 10*3/MM3 (ref 3.4–10.8)

## 2021-09-27 PROCEDURE — 36415 COLL VENOUS BLD VENIPUNCTURE: CPT

## 2021-09-27 PROCEDURE — 80061 LIPID PANEL: CPT

## 2021-09-27 PROCEDURE — 84443 ASSAY THYROID STIM HORMONE: CPT

## 2021-09-27 PROCEDURE — 82306 VITAMIN D 25 HYDROXY: CPT

## 2021-09-27 PROCEDURE — 93017 CV STRESS TEST TRACING ONLY: CPT

## 2021-09-27 PROCEDURE — 85027 COMPLETE CBC AUTOMATED: CPT

## 2021-09-27 PROCEDURE — 80053 COMPREHEN METABOLIC PANEL: CPT

## 2021-09-27 PROCEDURE — 93018 CV STRESS TEST I&R ONLY: CPT | Performed by: INTERNAL MEDICINE

## 2021-09-28 LAB — 25(OH)D3 SERPL-MCNC: 43.4 NG/ML (ref 30–100)

## 2021-09-28 NOTE — PROGRESS NOTES
Patient refused nuclear stress test due to having an IV. This test is inconclusive. If he has cardiac concerns, must repeat at some point with IV if he is willing.

## 2021-10-22 RX ORDER — OLMESARTAN MEDOXOMIL 40 MG/1
TABLET ORAL
Qty: 30 TABLET | Refills: 1 | Status: SHIPPED | OUTPATIENT
Start: 2021-10-22 | End: 2021-12-21

## 2021-12-21 RX ORDER — OLMESARTAN MEDOXOMIL 40 MG/1
TABLET ORAL
Qty: 30 TABLET | Refills: 1 | Status: SHIPPED | OUTPATIENT
Start: 2021-12-21 | End: 2022-03-01

## 2022-02-02 ENCOUNTER — TELEPHONE (OUTPATIENT)
Dept: CARDIOLOGY | Facility: CLINIC | Age: 52
End: 2022-02-02

## 2022-02-02 NOTE — TELEPHONE ENCOUNTER
Pt's wife called stated that he is having numbness in his arms and legs.  Wanted to know if it could be any of his cardiac meds, or if he just needed to contact his PCP.    I contacted PCP's office to make them aware too.

## 2022-03-01 RX ORDER — OLMESARTAN MEDOXOMIL 40 MG/1
TABLET ORAL
Qty: 30 TABLET | Refills: 1 | Status: SHIPPED | OUTPATIENT
Start: 2022-03-01 | End: 2022-05-04

## 2022-05-04 RX ORDER — OLMESARTAN MEDOXOMIL 40 MG/1
TABLET ORAL
Qty: 30 TABLET | Refills: 1 | Status: SHIPPED | OUTPATIENT
Start: 2022-05-04 | End: 2022-06-02

## 2022-05-09 ENCOUNTER — OFFICE VISIT (OUTPATIENT)
Dept: FAMILY MEDICINE CLINIC | Facility: CLINIC | Age: 52
End: 2022-05-09

## 2022-05-09 VITALS
DIASTOLIC BLOOD PRESSURE: 78 MMHG | BODY MASS INDEX: 16.44 KG/M2 | WEIGHT: 111 LBS | HEIGHT: 69 IN | TEMPERATURE: 98.2 F | SYSTOLIC BLOOD PRESSURE: 119 MMHG | RESPIRATION RATE: 18 BRPM | OXYGEN SATURATION: 98 % | HEART RATE: 66 BPM

## 2022-05-09 DIAGNOSIS — M17.12 ARTHRITIS OF LEFT KNEE: ICD-10-CM

## 2022-05-09 DIAGNOSIS — M25.562 ACUTE PAIN OF LEFT KNEE: Primary | ICD-10-CM

## 2022-05-09 PROCEDURE — 96372 THER/PROPH/DIAG INJ SC/IM: CPT | Performed by: NURSE PRACTITIONER

## 2022-05-09 PROCEDURE — 99213 OFFICE O/P EST LOW 20 MIN: CPT | Performed by: NURSE PRACTITIONER

## 2022-05-09 RX ORDER — HYDROCODONE BITARTRATE AND ACETAMINOPHEN 7.5; 325 MG/1; MG/1
1 TABLET ORAL 3 TIMES DAILY
COMMUNITY

## 2022-05-09 RX ORDER — DEXAMETHASONE 4 MG/1
TABLET ORAL
COMMUNITY
End: 2022-05-09 | Stop reason: ALTCHOICE

## 2022-05-09 RX ORDER — FLUTICASONE PROPIONATE AND SALMETEROL 50; 250 UG/1; UG/1
POWDER RESPIRATORY (INHALATION)
COMMUNITY
Start: 2022-04-11

## 2022-05-09 RX ORDER — LIDOCAINE HYDROCHLORIDE 20 MG/ML
SOLUTION OROPHARYNGEAL
COMMUNITY
End: 2022-05-09 | Stop reason: ALTCHOICE

## 2022-05-09 RX ORDER — INDOMETHACIN 50 MG/1
CAPSULE ORAL
COMMUNITY
End: 2022-05-09 | Stop reason: ALTCHOICE

## 2022-05-09 RX ORDER — FLUTICASONE PROPIONATE 50 MCG
SPRAY, SUSPENSION (ML) NASAL
COMMUNITY
End: 2022-05-09 | Stop reason: ALTCHOICE

## 2022-05-09 RX ORDER — TIZANIDINE 2 MG/1
TABLET ORAL
COMMUNITY
End: 2022-05-09 | Stop reason: ALTCHOICE

## 2022-05-09 RX ORDER — AMLODIPINE BESYLATE 5 MG/1
TABLET ORAL
COMMUNITY
Start: 2022-04-11 | End: 2022-05-09 | Stop reason: ALTCHOICE

## 2022-05-09 RX ORDER — KETOROLAC TROMETHAMINE 30 MG/ML
60 INJECTION, SOLUTION INTRAMUSCULAR; INTRAVENOUS ONCE
Status: COMPLETED | OUTPATIENT
Start: 2022-05-09 | End: 2022-05-09

## 2022-05-09 RX ORDER — CETIRIZINE HYDROCHLORIDE 10 MG/1
TABLET ORAL
COMMUNITY
End: 2022-05-09 | Stop reason: ALTCHOICE

## 2022-05-09 RX ORDER — MELOXICAM 15 MG/1
TABLET ORAL
COMMUNITY
End: 2022-05-09 | Stop reason: ALTCHOICE

## 2022-05-09 RX ORDER — DICYCLOMINE HCL 20 MG
TABLET ORAL
COMMUNITY
End: 2022-05-09 | Stop reason: ALTCHOICE

## 2022-05-09 RX ORDER — DEXLANSOPRAZOLE 60 MG/1
CAPSULE, DELAYED RELEASE ORAL
COMMUNITY
End: 2022-05-09 | Stop reason: ALTCHOICE

## 2022-05-09 RX ORDER — ALBUTEROL SULFATE 90 UG/1
AEROSOL, METERED RESPIRATORY (INHALATION)
COMMUNITY

## 2022-05-09 RX ORDER — CHLORTHALIDONE 25 MG/1
TABLET ORAL
COMMUNITY
End: 2022-05-09 | Stop reason: ALTCHOICE

## 2022-05-09 RX ORDER — IBUPROFEN 800 MG/1
TABLET ORAL
COMMUNITY

## 2022-05-09 RX ORDER — TIOTROPIUM BROMIDE INHALATION SPRAY 3.12 UG/1
SPRAY, METERED RESPIRATORY (INHALATION)
COMMUNITY
Start: 2022-04-11

## 2022-05-09 RX ORDER — KETOROLAC TROMETHAMINE 10 MG/1
TABLET, FILM COATED ORAL
COMMUNITY
End: 2022-05-09 | Stop reason: ALTCHOICE

## 2022-05-09 RX ORDER — LISINOPRIL 5 MG/1
TABLET ORAL
COMMUNITY
Start: 2022-04-11 | End: 2022-05-09 | Stop reason: ALTCHOICE

## 2022-05-09 RX ORDER — BUPROPION HYDROCHLORIDE 150 MG/1
TABLET, EXTENDED RELEASE ORAL
COMMUNITY
End: 2022-05-09 | Stop reason: ALTCHOICE

## 2022-05-09 RX ORDER — LOSARTAN POTASSIUM 50 MG/1
TABLET ORAL
COMMUNITY
End: 2022-05-09 | Stop reason: ALTCHOICE

## 2022-05-09 RX ORDER — GABAPENTIN 300 MG/1
CAPSULE ORAL
COMMUNITY

## 2022-05-09 RX ADMIN — KETOROLAC TROMETHAMINE 60 MG: 30 INJECTION, SOLUTION INTRAMUSCULAR; INTRAVENOUS at 11:29

## 2022-05-09 NOTE — PROGRESS NOTES
"Chief Complaint  Knee Pain (Patient states he hit his left knee on the bumper of a car last night. He has had extreme pain and some swelling. )    Subjective          Fredrick Henao presents to [facility] for acute care (knee pain/injury).    Knee Pain   The incident occurred 12 to 24 hours ago (5/8/2022). The incident occurred at home. Injury mechanism: \"bumped his knee on the car.\" The pain is present in the left knee. The quality of the pain is described as aching. The pain is at a severity of 9/10. The pain has been fluctuating since onset. Associated symptoms include a loss of motion. Pertinent negatives include no inability to bear weight, loss of sensation, muscle weakness, numbness or tingling. Associated symptoms comments: Difficulty with flexion/extension; pt states \"has to hold on to something with any weight bearing activity.\". He reports no foreign bodies present. The symptoms are aggravated by movement, weight bearing and palpation. He has tried rest (\"Tylenol and Ibuprofen don't work.\") for the symptoms. The treatment provided no relief.     Objective   Vital Signs:  /78 (BP Location: Left arm, Patient Position: Sitting, Cuff Size: Adult)   Pulse 66   Temp 98.2 °F (36.8 °C) (Temporal)   Resp 18   Ht 175.3 cm (69\")   Wt 50.3 kg (111 lb)   SpO2 98%   BMI 16.39 kg/m²     BMI is below normal parameters (malnutrition). Recommendations: none (medical contraindication)      Physical Exam  Vitals and nursing note reviewed.   Constitutional:       General: He is awake.      Appearance: Normal appearance.   HENT:      Head: Normocephalic.      Right Ear: Hearing and external ear normal.      Left Ear: Hearing and external ear normal.      Nose: Nose normal.      Mouth/Throat:      Lips: Pink.      Mouth: Mucous membranes are moist.   Eyes:      General: Lids are normal.      Conjunctiva/sclera: Conjunctivae normal.      Pupils: Pupils are equal, round, and reactive to " light.   Cardiovascular:      Rate and Rhythm: Normal rate and regular rhythm.      Pulses: Normal pulses.      Heart sounds: Normal heart sounds.   Pulmonary:      Effort: Pulmonary effort is normal.      Breath sounds: Normal breath sounds.   Abdominal:      General: Abdomen is flat. Bowel sounds are normal.      Palpations: Abdomen is soft.      Tenderness: There is no abdominal tenderness.   Musculoskeletal:      Cervical back: Normal range of motion.      Left knee: No swelling, erythema or ecchymosis. Decreased range of motion. Tenderness present. Normal pulse.      Right lower leg: No edema.      Left lower leg: No edema.   Skin:     General: Skin is warm and dry.      Capillary Refill: Capillary refill takes less than 2 seconds.   Neurological:      Mental Status: He is alert and oriented to person, place, and time.      Sensory: Sensation is intact.      Motor: Motor function is intact.      Coordination: Coordination is intact.      Gait: Gait is intact.   Psychiatric:         Attention and Perception: Attention and perception normal.         Mood and Affect: Mood and affect normal.         Speech: Speech normal.         Behavior: Behavior normal. Behavior is cooperative.         Thought Content: Thought content normal.        Result Review :   The following data was reviewed by: TRAM Cruz on 05/09/2022:    Data reviewed: Radiologic studies 5/9/2022 - x-ray left knee          Assessment and Plan    Diagnoses and all orders for this visit:    1. Acute pain of left knee (Primary)  -     XR Knee 1 or 2 View Left (In Office)  -     ketorolac (TORADOL) injection 60 mg  -     Cancel:  Crutches  -      Crutches    2. Arthritis of left knee  -      Crutches         I spent 25 minutes caring for Fredrick on this date of service. This time includes time spent by me in the following activities:preparing for the visit, reviewing tests, obtaining and/or reviewing a separately obtained history,  performing a medically appropriate examination and/or evaluation , counseling and educating the patient/family/caregiver, ordering medications, tests, or procedures, documenting information in the medical record and independently interpreting results and communicating that information with the patient/family/caregiver     Follow Up   Return if symptoms worsen or fail to improve / Primary Care Physician.  Patient was given instructions and counseling regarding his condition or for health maintenance advice. Please see specific information pulled into the AVS if appropriate.       This document has been electronically signed by TRAM Cruz  May 10, 2022 08:25 EDT    Patient is here for an urgent care/acute visit.  Patient has an established, non-S Primary Care Provider.

## 2022-06-02 RX ORDER — OLMESARTAN MEDOXOMIL 40 MG/1
TABLET ORAL
Qty: 30 TABLET | Refills: 1 | Status: SHIPPED | OUTPATIENT
Start: 2022-06-02 | End: 2022-08-04

## 2022-07-11 ENCOUNTER — OFFICE VISIT (OUTPATIENT)
Dept: FAMILY MEDICINE CLINIC | Facility: CLINIC | Age: 52
End: 2022-07-11

## 2022-07-11 VITALS
RESPIRATION RATE: 20 BRPM | BODY MASS INDEX: 16.68 KG/M2 | SYSTOLIC BLOOD PRESSURE: 117 MMHG | HEART RATE: 96 BPM | DIASTOLIC BLOOD PRESSURE: 74 MMHG | HEIGHT: 69 IN | OXYGEN SATURATION: 100 % | WEIGHT: 112.6 LBS | TEMPERATURE: 98.6 F

## 2022-07-11 DIAGNOSIS — R07.89 LEFT-SIDED CHEST WALL PAIN: Primary | ICD-10-CM

## 2022-07-11 PROCEDURE — 99213 OFFICE O/P EST LOW 20 MIN: CPT | Performed by: FAMILY MEDICINE

## 2022-07-11 PROCEDURE — 96372 THER/PROPH/DIAG INJ SC/IM: CPT | Performed by: FAMILY MEDICINE

## 2022-07-11 RX ORDER — KETOROLAC TROMETHAMINE 10 MG/1
10 TABLET, FILM COATED ORAL EVERY 6 HOURS PRN
Qty: 20 TABLET | Refills: 0 | Status: SHIPPED | OUTPATIENT
Start: 2022-07-11 | End: 2022-07-16

## 2022-07-11 RX ORDER — KETOROLAC TROMETHAMINE 30 MG/ML
60 INJECTION, SOLUTION INTRAMUSCULAR; INTRAVENOUS ONCE
Status: COMPLETED | OUTPATIENT
Start: 2022-07-11 | End: 2022-07-11

## 2022-07-11 RX ADMIN — KETOROLAC TROMETHAMINE 60 MG: 30 INJECTION, SOLUTION INTRAMUSCULAR; INTRAVENOUS at 15:46

## 2022-07-11 NOTE — PROGRESS NOTES
"Chief Complaint  Rib Injury (Left rib area; grandson fell back on rib area with head about 1 week ago)    Patient is here for an urgent care/acute visit.  Patient has an established, non-[facility] Primary Care Provider.       Subjective          Fredrick Henao presents to [facility]    The patient is a 58 like all like what felt was 89 like states resting urgency pharmacy body as excuses better than it was reviewed with [facility]  Pooja schedule places plan get in the way homework to call sleep again and will male who is here today because of left chest wall pain, he stated that he was laying on the bed and his grandson was playing and he apparently fell hitting him with his head on the left side of his chest.  This happened about a week ago but the pain is persistent, he was taking his hydrocodone and gabapentin from the pain doctor but he has run out of them. His pain is worse with lifting, coughing and laughing.      Objective   Vital Signs:  /74 (BP Location: Right arm, Patient Position: Sitting, Cuff Size: Adult)   Pulse 96   Temp 98.6 °F (37 °C) (Temporal)   Resp 20   Ht 175.3 cm (69\")   Wt 51.1 kg (112 lb 9.6 oz)   SpO2 100%   BMI 16.63 kg/m²        Physical Exam  Vitals and nursing note reviewed.   Constitutional:       General: He is not in acute distress.     Appearance: Normal appearance. He is well-developed and well-groomed.   HENT:      Head: Normocephalic and atraumatic.      Jaw: No tenderness or pain on movement.      Salivary Glands: Right salivary gland is not diffusely enlarged. Left salivary gland is not diffusely enlarged.      Right Ear: Tympanic membrane and ear canal normal.      Left Ear: Tympanic membrane and ear canal normal.      Nose: No congestion or rhinorrhea.      Mouth/Throat:      Mouth: Mucous membranes are moist.      Pharynx: No oropharyngeal exudate or posterior oropharyngeal erythema.   Eyes:      General: Lids " are normal. No allergic shiner or scleral icterus.     Conjunctiva/sclera: Conjunctivae normal.      Pupils: Pupils are equal, round, and reactive to light.   Neck:      Thyroid: No thyroid mass, thyromegaly or thyroid tenderness.      Trachea: Trachea normal.   Cardiovascular:      Rate and Rhythm: Normal rate and regular rhythm.  No extrasystoles are present.     Pulses: Normal pulses.      Heart sounds: Normal heart sounds. No murmur heard.  Pulmonary:      Effort: Pulmonary effort is normal. No respiratory distress.      Breath sounds: Normal breath sounds. No decreased breath sounds, wheezing, rhonchi or rales.   Chest:      Chest wall: Tenderness ( on the right lower chest wall, no bruising or swelling) present.   Breasts:      Right: Normal. No axillary adenopathy or supraclavicular adenopathy.      Left: Normal. No axillary adenopathy or supraclavicular adenopathy.       Abdominal:      General: Bowel sounds are normal.      Palpations: Abdomen is soft.      Tenderness: There is no abdominal tenderness. There is no right CVA tenderness, left CVA tenderness, guarding or rebound.   Musculoskeletal:      Cervical back: Neck supple.      Right lower leg: No edema.      Left lower leg: No edema.   Lymphadenopathy:      Cervical: No cervical adenopathy.      Upper Body:      Right upper body: No supraclavicular or axillary adenopathy.      Left upper body: No supraclavicular or axillary adenopathy.   Skin:     General: Skin is warm.      Nails: There is no clubbing.   Neurological:      General: No focal deficit present.      Mental Status: He is alert and oriented to person, place, and time.      Sensory: Sensation is intact.      Motor: Motor function is intact.      Coordination: Coordination is intact.   Psychiatric:         Attention and Perception: Attention and perception normal.         Mood and Affect: Mood normal.         Speech: Speech normal.         Behavior: Behavior normal. Behavior is cooperative.          Thought Content: Thought content normal.          Result Review :                 Assessment and Plan    Diagnoses and all orders for this visit:    1. Left-sided chest wall pain (Primary)  Comments:  ? contusion vs strain    Orders:  -     XR Ribs Left With PA Chest  -     ketorolac (TORADOL) injection 60 mg  -     ketorolac (TORADOL) 10 MG tablet; Take 1 tablet by mouth Every 6 (Six) Hours As Needed for Moderate Pain  for up to 5 days.  Dispense: 20 tablet; Refill: 0             Follow Up   Return if symptoms worsen or fail to improve.    Patient was given instructions and counseling regarding his condition or for health maintenance advice. Please see specific information pulled into the AVS if appropriate.     The patient was advised rest, applying heat to the sore area.  He was given a prescription for Toradol 10 mg 1 p.o. every 6 hours as needed and Toradol 60 mg IM x1 here at the office.  He was advised to follow-up with her PCP or go to the emergency room if symptoms worsen or fail to improve.        This document has been electronically signed by Emmanuel Mcfadden MD  July 11, 2022 15:44 EDT

## 2022-08-04 DIAGNOSIS — I10 ESSENTIAL HYPERTENSION: Primary | ICD-10-CM

## 2022-08-04 RX ORDER — OLMESARTAN MEDOXOMIL 40 MG/1
TABLET ORAL
Qty: 30 TABLET | Refills: 0 | Status: SHIPPED | OUTPATIENT
Start: 2022-08-04 | End: 2022-09-06

## 2022-09-06 DIAGNOSIS — I10 ESSENTIAL HYPERTENSION: ICD-10-CM

## 2022-09-06 RX ORDER — OLMESARTAN MEDOXOMIL 40 MG/1
TABLET ORAL
Qty: 30 TABLET | Refills: 0 | Status: SHIPPED | OUTPATIENT
Start: 2022-09-06 | End: 2022-10-10

## 2022-10-03 DIAGNOSIS — I10 ESSENTIAL HYPERTENSION: ICD-10-CM

## 2022-10-10 RX ORDER — OLMESARTAN MEDOXOMIL 40 MG/1
TABLET ORAL
Qty: 30 TABLET | Refills: 0 | Status: SHIPPED | OUTPATIENT
Start: 2022-10-10

## 2022-10-28 DIAGNOSIS — I10 ESSENTIAL HYPERTENSION: ICD-10-CM

## 2022-10-28 RX ORDER — OLMESARTAN MEDOXOMIL 40 MG/1
TABLET ORAL
Qty: 30 TABLET | Refills: 0 | OUTPATIENT
Start: 2022-10-28

## 2022-11-07 DIAGNOSIS — I10 ESSENTIAL HYPERTENSION: ICD-10-CM

## 2022-11-07 RX ORDER — OLMESARTAN MEDOXOMIL 40 MG/1
TABLET ORAL
Qty: 30 TABLET | Refills: 0 | OUTPATIENT
Start: 2022-11-07

## 2022-11-11 ENCOUNTER — TELEPHONE (OUTPATIENT)
Dept: CARDIOLOGY | Facility: CLINIC | Age: 52
End: 2022-11-11

## 2022-11-11 DIAGNOSIS — I10 ESSENTIAL HYPERTENSION: ICD-10-CM

## 2022-11-11 RX ORDER — OLMESARTAN MEDOXOMIL 40 MG/1
40 TABLET ORAL DAILY
Qty: 30 TABLET | Refills: 0 | OUTPATIENT
Start: 2022-11-11

## 2022-11-11 RX ORDER — OLMESARTAN MEDOXOMIL 40 MG/1
TABLET ORAL
Qty: 30 TABLET | Refills: 4 | OUTPATIENT
Start: 2022-11-11

## 2022-11-11 NOTE — TELEPHONE ENCOUNTER
Caller: Fredrick Henao    Relationship: Self    Best call back number:    [phone]          Requested Prescriptions:   Requested Prescriptions     Pending Prescriptions Disp Refills   • olmesartan (BENICAR) 40 MG tablet 30 tablet 0     Sig: Take 1 tablet by mouth Daily.        Pharmacy where request should be sent:  [facility] - [address] - [phone]  - [phone]   [phone]    Additional details provided by patient: PATIENT IS COMPLETELY OUT    Does the patient have less than a 3 day supply:  [x] Yes  [] No    Sami Lopez Rep   11/11/22 11:56 EST

## 2022-11-11 NOTE — TELEPHONE ENCOUNTER
Patient's wife, Dulce, called asking for refills on his medication. She was made aware that we have not seen him in over a year so refills would have to come from PCP. She verbalized understanding and states that she will call back and make him appointment at a later time.

## 2023-02-24 NOTE — PROGRESS NOTES
Chief Complaint   Patient presents with   • Med Refill     Pt's PCP and pulmonologist write all meds.  Pt does not know medications, was instructed to bring med list back this afternoon.     • Follow-up     Pt is here for cardiac follow up.  Pt states he does have SOB when it is cold outside.  He denies CP, dizziness or palpitations.   Pt does not take a daily ASA.   • Lab Work     Pt states their last labs were about a year ago with his PCP.         Cardiac Complaints  dyspnea      Subjective   Fredrick Henao is a 52 y.o. male with HTN, COPD, and murmur. He was sent to PCP about 3-4 months prior to his consult and his BP was elevated at more than 200 SBP and . He was advised at consult to have stress testing, echo,  and carotid US.  Carotid US showed no lesions bilaterally, echo showed normal LV function with no significant valve concerns and stress was not completed. It appears after consult, he had a great deal of issues with HTN and medication was increased and changed multiple times, lastly on candesartan 32mg daily, then later changed to benicar. Repeat stress 9/2021 done as a treadmill stress was inconclusive as he failed to reach target HR.     Patient comes today for follow up and admits to SOA, especially with colder weather. He denies CP, dizziness, palpitations, and syncope. He reports labs with PCP, checked most recently about a year ago. Followed with Dr. Alaniz for pulmonary and PCP, but admits it has been sometime since he has seen either.           Cardiac History  Past Surgical History:   Procedure Laterality Date   • CARDIOVASCULAR STRESS TEST  09/27/2021    R.Stress- 5 Min. 40 Secs. 7.00 METS. 80% THR. BP- 135/60. Inconclusive test   • EAR TUBE REMOVAL Left    • ECHO - CONVERTED  03/03/2020    EF 60%. Mild MR.   • ORIF FEMUR FRACTURE Left    • US CAROTID UNILATERAL  03/03/2020    No sig lesions       Current Outpatient Medications   Medication Sig Dispense Refill   • Advair Diskus 250-50  "MCG/ACT DISKUS      • albuterol sulfate  (90 Base) MCG/ACT inhaler ProAir HFA 90 mcg/actuation aerosol inhaler     • gabapentin (NEURONTIN) 300 MG capsule gabapentin 300 mg capsule     • HYDROcodone-acetaminophen (NORCO) 7.5-325 MG per tablet Take 1 tablet by mouth 3 (Three) Times a Day.     • ibuprofen (ADVIL,MOTRIN) 800 MG tablet ibuprofen 800 mg tablet     • olmesartan (BENICAR) 40 MG tablet TAKE ONE TABLET BY MOUTH EVERY DAY 30 tablet 0   • Spiriva Respimat 2.5 MCG/ACT aerosol solution inhaler        No current facility-administered medications for this visit.       Other    Past Medical History:   Diagnosis Date   • Back pain     Lumbar   • COPD (chronic obstructive pulmonary disease) ([facility])    • Heart murmur    • History of ear surgery    • Hypertension    • Leg fracture     s/p steel danieal, then later removed       Social History     Socioeconomic History   • Marital status:    Tobacco Use   • Smoking status: Every Day     Packs/day: 1.50     Years: 38.00     Pack years: 57.00     Types: Cigarettes   • Smokeless tobacco: Never   Vaping Use   • Vaping Use: Never used   Substance and Sexual Activity   • Alcohol use: Never   • Drug use: Never   • Sexual activity: Defer       Family History   Problem Relation Age of Onset   • Diabetes Mother    • Cancer Father    • Heart attack Father         MI at early age,  at 55 with cancer, MI prior to t   • Heart attack Sister         MI at 40, s/p stenting   • No Known Problems Brother    • Heart disease Sister         open heart due to \" hole in her heart   • Cancer Sister    • No Known Problems Sister    • No Known Problems Sister    • No Known Problems Daughter    • No Known Problems Daughter    • No Known Problems Son        Review of Systems   Constitutional: Negative for malaise/fatigue and night sweats.   Cardiovascular: Positive for dyspnea on exertion. Negative for chest pain, claudication, irregular heartbeat, leg swelling, near-syncope, orthopnea " "and syncope.   Respiratory: Positive for shortness of breath. Negative for cough and wheezing.    Musculoskeletal: Positive for stiffness. Negative for back pain and joint pain.   Gastrointestinal: Negative for anorexia, heartburn, nausea and vomiting.   Genitourinary: Negative for dysuria, hematuria, hesitancy and nocturia.   Neurological: Negative for dizziness, headaches and light-headedness.   Psychiatric/Behavioral: Negative for depression and memory loss. The patient is not nervous/anxious.            Objective     BP (!) 88/54 (BP Location: Left arm, Patient Position: Sitting)   Pulse 92   Ht 175.3 cm (69\")   Wt 47.1 kg (103 lb 12.8 oz)   BMI 15.33 kg/m²     Constitutional:       Appearance: Frail.   Eyes:      Pupils: Pupils are equal, round, and reactive to light.   HENT:      Nose: Nose normal.   Pulmonary:      Effort: Pulmonary effort is normal.      Breath sounds: Normal breath sounds.   Cardiovascular:      PMI at left midclavicular line. Normal rate. Regular rhythm.      Murmurs: There is a systolic murmur.   Abdominal:      Palpations: Abdomen is soft.   Musculoskeletal: Normal range of motion.      Cervical back: Normal range of motion and neck supple. Skin:     General: Skin is warm and dry.   Neurological:      Mental Status: Alert.         Procedures         Diagnoses and all orders for this visit:    1. Shortness of breath (Primary)  -     XR Chest PA & Lateral; Future    2. Essential hypertension    3. Acute hypotension    4. Weight loss  -     XR Chest PA & Lateral; Future    5. Smoking    6. Underweight             HTN: BP is low today, he was urged to cut benicar to 20mg if he is indeed taking 40mg. Adequate fluid intake advised.     Cardiac status: Stable. No new concerns voiced. No repeat workup advised.     COPD: SOA is noted, similar to prior. Continues use of advair, spiriva, and albuterol. Followed by pulmonary. Still smoking despite concerns.     Tobacco abuse: Unable to quit " smoking at present. Weight loss continues to be of concern. Order for PA and lateral xray provided. Strongly urged to follow with Katty    BMI around 15.33, concerned about continued weight loss. High protein diet urged. Patient encouraged to discuss weight gain supplement with you.    Been a year since he had labs, strongly urged to follow with you in regards.    Refills per request.    6 month follow up advised, or sooner if needed.      Problems Addressed this Visit        Cardiac and Vasculature    Essential hypertension       Pulmonary and Pneumonias    Shortness of breath - Primary    Relevant Orders    XR Chest PA & Lateral       Tobacco    Smoking   Other Visit Diagnoses     Acute hypotension        Weight loss        Relevant Orders    XR Chest PA & Lateral    Underweight          Diagnoses       Codes Comments    Shortness of breath    -  Primary ICD-10-CM: R06.02  ICD-9-CM: 786.05     Essential hypertension     ICD-10-CM: I10  ICD-9-CM: 401.9     Acute hypotension     ICD-10-CM: I95.9  ICD-9-CM: 458.9     Weight loss     ICD-10-CM: R63.4  ICD-9-CM: 783.21     Smoking     ICD-10-CM: F17.200  ICD-9-CM: 305.1     Underweight     ICD-10-CM: R63.6  ICD-9-CM: 783.22                    Electronically signed by Juana Do, APRN February 27, 2023 09:06 EST

## 2023-02-27 ENCOUNTER — OFFICE VISIT (OUTPATIENT)
Dept: CARDIOLOGY | Facility: CLINIC | Age: 53
End: 2023-02-27
Payer: COMMERCIAL

## 2023-02-27 VITALS
BODY MASS INDEX: 15.37 KG/M2 | DIASTOLIC BLOOD PRESSURE: 54 MMHG | HEIGHT: 69 IN | WEIGHT: 103.8 LBS | SYSTOLIC BLOOD PRESSURE: 88 MMHG | HEART RATE: 92 BPM

## 2023-02-27 DIAGNOSIS — F17.200 SMOKING: ICD-10-CM

## 2023-02-27 DIAGNOSIS — I10 ESSENTIAL HYPERTENSION: ICD-10-CM

## 2023-02-27 DIAGNOSIS — I95.9 ACUTE HYPOTENSION: ICD-10-CM

## 2023-02-27 DIAGNOSIS — R63.4 WEIGHT LOSS: ICD-10-CM

## 2023-02-27 DIAGNOSIS — R63.6 UNDERWEIGHT: ICD-10-CM

## 2023-02-27 DIAGNOSIS — R06.02 SHORTNESS OF BREATH: Primary | ICD-10-CM

## 2023-02-27 PROCEDURE — 99213 OFFICE O/P EST LOW 20 MIN: CPT | Performed by: NURSE PRACTITIONER

## 2023-05-04 ENCOUNTER — TELEPHONE (OUTPATIENT)
Dept: CARDIOLOGY | Facility: CLINIC | Age: 53
End: 2023-05-04

## 2023-05-04 NOTE — TELEPHONE ENCOUNTER
Caller: Fredrick Henao    Relationship to patient: Self    Best call back number: [phone]    Chief complaint: E.D. VISIT AT [facility]    Type of visit: HOSPITAL FOLLOW UP    Requested date: ASAP      Additional notes:PATIENT WAS SEEN BY [facility] EMERGENCY DEPT ON 05-04-23 AND WAS DISCHARGED SAME DATE. WAS TOLD FOLLOW UP WITH CARDIOLOGIST AND DID NOT SPECIFY A TIME FRAME FOR FOLLOW UP

## 2023-05-05 ENCOUNTER — TELEPHONE (OUTPATIENT)
Dept: CARDIOLOGY | Facility: CLINIC | Age: 53
End: 2023-05-05
Payer: COMMERCIAL

## 2023-05-05 NOTE — TELEPHONE ENCOUNTER
After reading ER note, acute cardiac concerns were ruled out. All workup was neg.  It appears chest pain was related to coughing, as he had an exacerbation of COPD. WBC was elevated at over 15. Please advise to see PCP and if she then deems he needs to be seen after pulmonary concerns ruled out, we will schedule appt.

## 2023-06-07 ENCOUNTER — TELEPHONE (OUTPATIENT)
Dept: CARDIOLOGY | Facility: CLINIC | Age: 53
End: 2023-06-07
Payer: COMMERCIAL

## 2023-06-07 NOTE — TELEPHONE ENCOUNTER
Patient's wife, Dulce, called stating that patient BP has been running low. States that last ngiht he went to the ER for a foot injury and BP was 88/58 and this morning he had to go to foot doctor and BP was 105/58. She does not know what heart rate has been running. She states that he has been drinking plenty of fluid. Patient is taking olmesartan 20 mg daily. Do you have any recommendations?

## 2023-09-07 ENCOUNTER — OFFICE VISIT (OUTPATIENT)
Dept: CARDIOLOGY | Facility: CLINIC | Age: 53
End: 2023-09-07
Payer: COMMERCIAL

## 2023-09-07 VITALS
WEIGHT: 102.8 LBS | DIASTOLIC BLOOD PRESSURE: 90 MMHG | BODY MASS INDEX: 15.23 KG/M2 | SYSTOLIC BLOOD PRESSURE: 130 MMHG | HEIGHT: 69 IN | HEART RATE: 96 BPM

## 2023-09-07 DIAGNOSIS — F17.200 SMOKING: ICD-10-CM

## 2023-09-07 DIAGNOSIS — J44.9 COPD MIXED TYPE: ICD-10-CM

## 2023-09-07 DIAGNOSIS — I10 ESSENTIAL HYPERTENSION: ICD-10-CM

## 2023-09-07 DIAGNOSIS — R01.1 MURMUR, CARDIAC: ICD-10-CM

## 2023-09-07 DIAGNOSIS — R06.02 SHORTNESS OF BREATH: Primary | ICD-10-CM

## 2023-09-07 PROCEDURE — 99214 OFFICE O/P EST MOD 30 MIN: CPT | Performed by: NURSE PRACTITIONER

## 2023-09-07 PROCEDURE — 3075F SYST BP GE 130 - 139MM HG: CPT | Performed by: NURSE PRACTITIONER

## 2023-09-07 PROCEDURE — 3080F DIAST BP >= 90 MM HG: CPT | Performed by: NURSE PRACTITIONER

## 2023-09-07 RX ORDER — BUDESONIDE, GLYCOPYRROLATE, AND FORMOTEROL FUMARATE 160; 9; 4.8 UG/1; UG/1; UG/1
2 AEROSOL, METERED RESPIRATORY (INHALATION) 2 TIMES DAILY
COMMUNITY

## 2023-09-07 RX ORDER — DILTIAZEM HYDROCHLORIDE 120 MG/1
120 CAPSULE, COATED, EXTENDED RELEASE ORAL DAILY
Qty: 30 CAPSULE | Refills: 8 | Status: SHIPPED | OUTPATIENT
Start: 2023-09-07

## 2023-09-07 RX ORDER — BUPRENORPHINE HYDROCHLORIDE AND NALOXONE HYDROCHLORIDE DIHYDRATE 8; 2 MG/1; MG/1
2 TABLET SUBLINGUAL DAILY
COMMUNITY

## 2023-09-07 NOTE — PROGRESS NOTES
Chief Complaint   Patient presents with    Follow-up     Pt is here for cardiac follow up.  Pt states he is having a lot of SOB at night.  He denies CP, dizziness or palpitations.  Pt does not take a daily ASA.      Med Refill     Medications were verified by med bottles.    Lab Work     Pt states their last labs were with his PCP.         Cardiac Complaints  dyspnea      Subjective   Fredrick Henao is a 53 y.o. male with HTN, COPD, and murmur. He was sent to PCP about 3-4 months prior to his consult and his BP was elevated at more than 200 SBP and . He was advised at consult to have stress testing, echo,  and carotid US.  Carotid US showed no lesions bilaterally, echo showed normal LV function with no significant valve concerns and stress was not completed. It appears after consult, he had a great deal of issues with HTN and medication was increased and changed multiple times, lastly on candesartan 32mg daily, then later changed to benicar. Repeat stress 9/2021 done as a treadmill stress was inconclusive as he failed to reach target HR.     He comes today for follow up and continues to have SOA at night. He denies any CP, dizziness, palpitations, and syncope. Labs with PCP, unsure of last check. Still not seeing pulmonary. Smoking despite concerns.          Cardiac History  Past Surgical History:   Procedure Laterality Date    CARDIOVASCULAR STRESS TEST  09/27/2021    R.Stress- 5 Min. 40 Secs. 7.00 METS. 80% THR. BP- 135/60. Inconclusive test    EAR TUBE REMOVAL Left     ECHO - CONVERTED  03/03/2020    EF 60%. Mild MR.    ORIF FEMUR FRACTURE Left     US CAROTID UNILATERAL  03/03/2020    No sig lesions       Current Outpatient Medications   Medication Sig Dispense Refill    albuterol sulfate  (90 Base) MCG/ACT inhaler ProAir HFA 90 mcg/actuation aerosol inhaler      Budeson-Glycopyrrol-Formoterol (Breztri Aerosphere) 160-9-4.8 MCG/ACT aerosol inhaler Inhale 2 puffs 2 (Two) Times a Day.       "buprenorphine-naloxone (SUBOXONE) 8-2 MG per SL tablet Place 2 tablets under the tongue Daily.      dilTIAZem CD (Cardizem CD) 120 MG 24 hr capsule Take 1 capsule by mouth Daily. 30 capsule 8     No current facility-administered medications for this visit.       Other    Past Medical History:   Diagnosis Date    Back pain     Lumbar    COPD (chronic obstructive pulmonary disease)     Heart murmur     History of ear surgery     Hypertension     Leg fracture     s/p steel daniela, then later removed       Social History     Socioeconomic History    Marital status:    Tobacco Use    Smoking status: Every Day     Packs/day: 1.00     Years: 38.00     Pack years: 38.00     Types: Cigarettes    Smokeless tobacco: Never   Vaping Use    Vaping Use: Never used   Substance and Sexual Activity    Alcohol use: Never    Drug use: Never    Sexual activity: Defer       Family History   Problem Relation Age of Onset    Diabetes Mother     Cancer Father     Heart attack Father         MI at early age,  at 55 with cancer, MI prior to t    Heart attack Sister         MI at 40, s/p stenting    No Known Problems Brother     Heart disease Sister         open heart due to \" hole in her heart    Cancer Sister     No Known Problems Sister     No Known Problems Sister     No Known Problems Daughter     No Known Problems Daughter     No Known Problems Son        Review of Systems   Constitutional: Negative for malaise/fatigue and night sweats.   Cardiovascular:  Negative for chest pain, claudication, dyspnea on exertion, irregular heartbeat, leg swelling, near-syncope, orthopnea, palpitations and syncope.   Respiratory:  Positive for shortness of breath. Negative for cough and wheezing.    Musculoskeletal:  Negative for back pain, joint pain and stiffness.   Gastrointestinal:  Negative for anorexia, heartburn, nausea and vomiting.   Genitourinary:  Negative for dysuria, hematuria, hesitancy and nocturia.   Neurological:  Negative for " "dizziness, headaches and light-headedness.   Psychiatric/Behavioral:  Negative for depression and memory loss. The patient is not nervous/anxious.          Objective     /90 (BP Location: Left arm, Patient Position: Sitting)   Pulse 96   Ht 175.3 cm (69\")   Wt 46.6 kg (102 lb 12.8 oz)   BMI 15.18 kg/m²     Constitutional:       Appearance: Frail.   Eyes:      Pupils: Pupils are equal, round, and reactive to light.   HENT:      Nose: Nose normal.   Pulmonary:      Effort: Pulmonary effort is normal.      Breath sounds: Wheezing present.   Cardiovascular:      PMI at left midclavicular line. Normal rate. Regular rhythm.      Murmurs: There is a systolic murmur.   Abdominal:      Palpations: Abdomen is soft.   Musculoskeletal: Normal range of motion.      Cervical back: Normal range of motion and neck supple. Skin:     General: Skin is warm and dry.   Neurological:      Mental Status: Alert.       Procedures         Diagnoses and all orders for this visit:    1. Shortness of breath (Primary)  -     Adult Transthoracic Echo Complete W/ Cont if Necessary Per Protocol; Future    2. Essential hypertension    3. Murmur, cardiac    4. COPD mixed type    5. Smoking    Other orders  -     dilTIAZem CD (Cardizem CD) 120 MG 24 hr capsule; Take 1 capsule by mouth Daily.  Dispense: 30 capsule; Refill: 8             HTN: BP mildly elevated and HR continues to increase, will urge to add cardizem at 120mg nightly.      Cardiac status: SOA noted, will urge to repeat echo to assess LV function, valve areas, and RVSP. More recommendations to follow.     COPD: SOA is noted, similar to prior. Continues inhaler therapy. Followed by pulmonary, but has not seen for sometime, strongly urged to get appt. Still smoking despite concerns.      Tobacco abuse: Unable to quit smoking at present. Weight loss continues to be of concern. Strongly urged to follow with Katty once again. Please consider low dose chest CT if not done.     BMI " around 15.18, concerned about continued weight loss. High protein diet urged. Patient encouraged to discuss weight gain supplement with you.     Labs are with you, he can not recall last check. Can we have for review?     Refills per request.     6 month follow up advised, or sooner if needed.                 Problems Addressed this Visit          Cardiac and Vasculature    Essential hypertension    Relevant Medications    dilTIAZem CD (Cardizem CD) 120 MG 24 hr capsule    Murmur, cardiac       Pulmonary and Pneumonias    Shortness of breath - Primary    Relevant Orders    Adult Transthoracic Echo Complete W/ Cont if Necessary Per Protocol       Tobacco    Smoking     Other Visit Diagnoses       COPD mixed type        Relevant Medications    Budeson-Glycopyrrol-Formoterol (Breztri Aerosphere) 160-9-4.8 MCG/ACT aerosol inhaler          Diagnoses         Codes Comments    Shortness of breath    -  Primary ICD-10-CM: R06.02  ICD-9-CM: 786.05     Essential hypertension     ICD-10-CM: I10  ICD-9-CM: 401.9     Murmur, cardiac     ICD-10-CM: R01.1  ICD-9-CM: 785.2     COPD mixed type     ICD-10-CM: J44.9  ICD-9-CM: 496     Smoking     ICD-10-CM: F17.200  ICD-9-CM: 305.1                   Fredrick Henao  reports that he has been smoking cigarettes. He has a 38.00 pack-year smoking history. He has never used smokeless tobacco.. I have educated him on the risk of diseases from using tobacco products .     I advised him to quit and he is not willing to quit.    I spent 3  minutes counseling the patient.                 Electronically signed by TRAM Jones September 7, 2023 11:26 EDT

## 2023-09-12 ENCOUNTER — TELEPHONE (OUTPATIENT)
Dept: CARDIOLOGY | Facility: CLINIC | Age: 53
End: 2023-09-12
Payer: COMMERCIAL

## 2023-09-12 NOTE — TELEPHONE ENCOUNTER
I spoke with patient and he reports when he takes Diltiazem he gets a headache . He has not taken diltiazem  today or checked HR and BP

## 2023-09-12 NOTE — TELEPHONE ENCOUNTER
I would urge to take some tylenol before the medicine. Take at night before bed. His HR continues to go up, we need to slow it down. Come by for BP and pulse check. Better option for his breathing vs a BB, but we could try that if he would like. He needs to give his body time to adjust to the medication.

## 2023-09-12 NOTE — TELEPHONE ENCOUNTER
Caller: EMA FARRAR    Relationship: Emergency Contact    Best call back number: [phone]- PATIENT NUMBER   What medications are you currently taking:   Current Outpatient Medications on File Prior to Visit   Medication Sig Dispense Refill    albuterol sulfate  (90 Base) MCG/ACT inhaler ProAir HFA 90 mcg/actuation aerosol inhaler      Budeson-Glycopyrrol-Formoterol (Breztri Aerosphere) 160-9-4.8 MCG/ACT aerosol inhaler Inhale 2 puffs 2 (Two) Times a Day.      buprenorphine-naloxone (SUBOXONE) 8-2 MG per SL tablet Place 2 tablets under the tongue Daily.      dilTIAZem CD (Cardizem CD) 120 MG 24 hr capsule Take 1 capsule by mouth Daily. 30 capsule 8     No current facility-administered medications on file prior to visit.          When did you start taking these medications: 09.07.23    Which medication are you concerned about: DILTIAZEM 120MG     Who prescribed you this medication: ELLEN UGALDE     What are your concerns: PATIENT GETS REALLY BAD HEADACHES WHEN TAKING THIS MEDICATION. PLEASE CALL TO ADDRESS THE CONCERN.     How long have you had these concerns: EVERYDAY SINCE TAKING - HEADACHES OCCUR 3-4 TIMES A DAY.

## 2023-09-13 ENCOUNTER — TELEPHONE (OUTPATIENT)
Dept: CARDIOLOGY | Facility: CLINIC | Age: 53
End: 2023-09-13

## 2023-09-13 ENCOUNTER — CLINICAL SUPPORT (OUTPATIENT)
Dept: CARDIOLOGY | Facility: CLINIC | Age: 53
End: 2023-09-13
Payer: COMMERCIAL

## 2023-09-13 DIAGNOSIS — R00.0 SINUS TACHYCARDIA: Primary | ICD-10-CM

## 2023-09-13 PROCEDURE — 93000 ELECTROCARDIOGRAM COMPLETE: CPT | Performed by: NURSE PRACTITIONER

## 2023-09-13 NOTE — TELEPHONE ENCOUNTER
Please let him know, his BP is up. This may be a reason for the headache. Also, we can try to change the CCB to a BB. Please let him know, this may come with more sexual side effects, but he needs one or the other. Let me know what he would prefer.

## 2023-09-13 NOTE — PROGRESS NOTES
Procedure     ECG 12 Lead    Date/Time: 9/13/2023 9:33 AM  Performed by: Juana Do APRN  Authorized by: Juana Do APRN   Comparison: compared with previous ECG from 2/24/2020  Similar to previous ECG  Rhythm: sinus rhythm  BPM: 87    Clinical impression: normal ECG  Comments: Normal QT

## 2023-09-14 ENCOUNTER — HOSPITAL ENCOUNTER (OUTPATIENT)
Dept: CARDIOLOGY | Facility: HOSPITAL | Age: 53
Discharge: HOME | End: 2023-09-14
Payer: COMMERCIAL

## 2023-09-14 VITALS — BODY MASS INDEX: 15.22 KG/M2 | HEIGHT: 69 IN | WEIGHT: 102.73 LBS

## 2023-09-14 DIAGNOSIS — R06.02 SHORTNESS OF BREATH: ICD-10-CM

## 2023-09-14 LAB
AORTIC DIMENSIONLESS INDEX: 0.87 (DI)
BH CV ECHO MEAS - ACS: 2.1 CM
BH CV ECHO MEAS - AO MAX PG: 4.4 MMHG
BH CV ECHO MEAS - AO MEAN PG: 2.5 MMHG
BH CV ECHO MEAS - AO ROOT DIAM: 3.3 CM
BH CV ECHO MEAS - AO V2 MAX: 105.3 CM/SEC
BH CV ECHO MEAS - AO V2 VTI: 23.5 CM
BH CV ECHO MEAS - EDV(CUBED): 68.2 ML
BH CV ECHO MEAS - EF(MOD-BP): 58 %
BH CV ECHO MEAS - EF_3D-VOL: 56 %
BH CV ECHO MEAS - ESV(CUBED): 23.6 ML
BH CV ECHO MEAS - FS: 29.8 %
BH CV ECHO MEAS - IVS/LVPW: 0.86 CM
BH CV ECHO MEAS - IVSD: 0.75 CM
BH CV ECHO MEAS - LA DIMENSION: 2.7 CM
BH CV ECHO MEAS - LAT PEAK E' VEL: 12 CM/SEC
BH CV ECHO MEAS - LV MASS(C)D: 99 GRAMS
BH CV ECHO MEAS - LV MAX PG: 3.3 MMHG
BH CV ECHO MEAS - LV MEAN PG: 1.81 MMHG
BH CV ECHO MEAS - LV V1 MAX: 91.5 CM/SEC
BH CV ECHO MEAS - LV V1 VTI: 20.7 CM
BH CV ECHO MEAS - LVIDD: 4.1 CM
BH CV ECHO MEAS - LVIDS: 2.9 CM
BH CV ECHO MEAS - LVPWD: 0.87 CM
BH CV ECHO MEAS - MED PEAK E' VEL: 11.4 CM/SEC
BH CV ECHO MEAS - MV A MAX VEL: 75.3 CM/SEC
BH CV ECHO MEAS - MV DEC SLOPE: 503.2 CM/SEC2
BH CV ECHO MEAS - MV DEC TIME: 0.19 MSEC
BH CV ECHO MEAS - MV E MAX VEL: 105 CM/SEC
BH CV ECHO MEAS - MV E/A: 1.39
BH CV ECHO MEAS - MV MAX PG: 5 MMHG
BH CV ECHO MEAS - MV MEAN PG: 2.18 MMHG
BH CV ECHO MEAS - MV P1/2T: 69.2 MSEC
BH CV ECHO MEAS - MV V2 VTI: 29.8 CM
BH CV ECHO MEAS - MVA(P1/2T): 3.2 CM2
BH CV ECHO MEAS - PA V2 MAX: 89.8 CM/SEC
BH CV ECHO MEAS - RAP SYSTOLE: 8 MMHG
BH CV ECHO MEAS - RV MAX PG: 2.38 MMHG
BH CV ECHO MEAS - RV V1 MAX: 77.1 CM/SEC
BH CV ECHO MEAS - RV V1 VTI: 16.2 CM
BH CV ECHO MEAS - RVSP: 38.1 MMHG
BH CV ECHO MEAS - TAPSE (>1.6): 2.08 CM
BH CV ECHO MEAS - TR MAX PG: 30.1 MMHG
BH CV ECHO MEAS - TR MAX VEL: 274.3 CM/SEC
BH CV ECHO MEASUREMENTS AVERAGE E/E' RATIO: 8.97
BH CV XLRA - TDI S': 12.4 CM/SEC
SINUS: 2.9 CM

## 2023-09-14 PROCEDURE — 93306 TTE W/DOPPLER COMPLETE: CPT

## 2023-09-15 ENCOUNTER — TELEPHONE (OUTPATIENT)
Dept: CARDIOLOGY | Facility: CLINIC | Age: 53
End: 2023-09-15
Payer: COMMERCIAL

## 2023-09-15 NOTE — TELEPHONE ENCOUNTER
Caller: EMA FARRAR    Relationship: Emergency Contact    Best call back number: [phone]     Caller requesting test results: WIFE    What test was performed: ULTRASOUND OF HEART     When was the test performed: 9/14    Where was the test performed: IN OUR OFFICE     Additional notes: PT HAD SOME TESTING DONE ON 9/14/23 AND HE IS WONDERING IF THESE RESULTS ARE AVAILABLE. PLEASE ADVISE.

## 2023-09-18 ENCOUNTER — TELEPHONE (OUTPATIENT)
Dept: CARDIOLOGY | Facility: CLINIC | Age: 53
End: 2023-09-18
Payer: COMMERCIAL

## 2023-09-18 RX ORDER — METOPROLOL SUCCINATE 50 MG/1
50 TABLET, EXTENDED RELEASE ORAL DAILY
Qty: 30 TABLET | Refills: 11 | Status: SHIPPED | OUTPATIENT
Start: 2023-09-18

## 2023-09-18 NOTE — TELEPHONE ENCOUNTER
Caller: EMA FARRAR    Relationship: Emergency Contact    Best call back number:[phone]    What medications are you currently taking:   Current Outpatient Medications on File Prior to Visit   Medication Sig Dispense Refill    albuterol sulfate  (90 Base) MCG/ACT inhaler ProAir HFA 90 mcg/actuation aerosol inhaler      Budeson-Glycopyrrol-Formoterol (Breztri Aerosphere) 160-9-4.8 MCG/ACT aerosol inhaler Inhale 2 puffs 2 (Two) Times a Day.      buprenorphine-naloxone (SUBOXONE) 8-2 MG per SL tablet Place 2 tablets under the tongue Daily.      dilTIAZem CD (Cardizem CD) 120 MG 24 hr capsule Take 1 capsule by mouth Daily. 30 capsule 8     No current facility-administered medications on file prior to visit.          When did you start taking these medications: 2 WEEKS AGO    Which medication are you concerned about: DILTIAZEM  MG    Who prescribed you this medication: TRAM ZARATE    What are your concerns: MEDICATION IS CAUSING PATIENT HEADACHES.  PATIENTS WIFE WANTS TO KNOW IF TYLENOL OR SOMETHING FOR HEADACHE CAN BE CALLED IN .. PATIENT CAN'T TAKE IBUPROFEN.  OR CHANGE MEDICATION (DILTIAZEM) TO SOMETHING ELSE.    How long have you had these concerns: SINCE BEGINNING TO TAKE MEDICATION

## 2023-09-20 ENCOUNTER — TELEPHONE (OUTPATIENT)
Dept: CARDIOLOGY | Facility: CLINIC | Age: 53
End: 2023-09-20

## 2023-09-20 NOTE — TELEPHONE ENCOUNTER
Caller: EMA FARRAR     Relationship: [unfilled]     Best call back number:[phone]    What is your medical concern? THE PT IS CONCERNED THE METOPROLOL SUCCINATE XL 50 MG IS GIVING HIM HEADACHES. PT WOULD LIKE A DIFFERENT MEDICATION.    How long has this issue been going on?     Is your provider already aware of this issue? YES    Have you been treated for this issue? MEDICATION WAS CHANGED

## 2023-09-20 NOTE — TELEPHONE ENCOUNTER
Spoke with pt, he is going to see PCP to try to see what is causing headaches.  Explained to pt that since the headaches continued after med was changed, that it probably is not medication related. He voiced understanding.

## 2024-03-26 ENCOUNTER — OFFICE VISIT (OUTPATIENT)
Dept: CARDIOLOGY | Facility: CLINIC | Age: 54
End: 2024-03-26
Payer: COMMERCIAL

## 2024-03-26 VITALS
SYSTOLIC BLOOD PRESSURE: 112 MMHG | HEIGHT: 68 IN | BODY MASS INDEX: 16.15 KG/M2 | WEIGHT: 106.6 LBS | DIASTOLIC BLOOD PRESSURE: 68 MMHG | HEART RATE: 98 BPM

## 2024-03-26 DIAGNOSIS — I10 ESSENTIAL HYPERTENSION: Primary | ICD-10-CM

## 2024-03-26 DIAGNOSIS — F17.200 SMOKING: ICD-10-CM

## 2024-03-26 DIAGNOSIS — R00.2 PALPITATIONS: ICD-10-CM

## 2024-03-26 DIAGNOSIS — R06.02 SHORTNESS OF BREATH: ICD-10-CM

## 2024-03-26 PROCEDURE — 3078F DIAST BP <80 MM HG: CPT | Performed by: NURSE PRACTITIONER

## 2024-03-26 PROCEDURE — 99214 OFFICE O/P EST MOD 30 MIN: CPT | Performed by: NURSE PRACTITIONER

## 2024-03-26 PROCEDURE — 3074F SYST BP LT 130 MM HG: CPT | Performed by: NURSE PRACTITIONER

## 2024-03-26 RX ORDER — METOPROLOL SUCCINATE 50 MG/1
50 TABLET, EXTENDED RELEASE ORAL DAILY
Qty: 90 TABLET | Refills: 2 | Status: SHIPPED | OUTPATIENT
Start: 2024-03-26

## 2024-03-26 RX ORDER — FAMOTIDINE 20 MG/1
20 TABLET, FILM COATED ORAL
COMMUNITY

## 2024-03-26 RX ORDER — METOPROLOL SUCCINATE 25 MG/1
25 TABLET, EXTENDED RELEASE ORAL DAILY
Qty: 90 TABLET | Refills: 3 | Status: SHIPPED | OUTPATIENT
Start: 2024-03-26 | End: 2024-03-26

## 2024-03-26 RX ORDER — OLMESARTAN MEDOXOMIL 20 MG/1
20 TABLET ORAL DAILY
Qty: 90 TABLET | Refills: 2 | Status: SHIPPED | OUTPATIENT
Start: 2024-03-26

## 2024-03-26 RX ORDER — OLMESARTAN MEDOXOMIL 40 MG/1
20 TABLET ORAL DAILY
COMMUNITY
End: 2024-03-26 | Stop reason: DRUGHIGH

## 2024-03-26 NOTE — PROGRESS NOTES
Chief Complaint   Patient presents with    Follow-up     Pt is here for cardiac follow up.  Pt reports SOB.  He denies CP, dizziness or palpitations.  Pt does take a daily ASA.      Med Refill     Pt request 30 day refills to be sent to [facility].  Medications were verified by the pt.      Lab Work     Pt states their last labs were a few months ago with his PCP.         Cardiac Complaints  none      Subjective   Fredrick Henao is a 53 y.o. male with HTN, COPD, and murmur. He was sent to PCP about 3-4 months prior to his consult and his BP was elevated at more than 200 SBP and . He was advised at consult to have stress testing, echo,  and carotid US.  Carotid US showed no lesions bilaterally, echo showed normal LV function with no significant valve concerns and stress was not completed. It appears after consult, he had a great deal of issues with HTN and medication was increased and changed multiple times, lastly on candesartan 32mg daily, then later changed to benicar. Repeat stress 9/2021 done as a treadmill stress was inconclusive as he failed to reach target HR. Echo done 9/2023 showed EF 65%, mild MR, and RVSP 39mmHG.    Patient comes today for follow up and SOA is reported, similar to prior. Unfortunately, still smoking despite concerns. No CP, dizziness, palpitations, or syncope noted. Labs with PCP, checked a few months ago. Refills requested.             Cardiac History  Past Surgical History:   Procedure Laterality Date    CARDIOVASCULAR STRESS TEST  09/27/2021    R.Stress- 5 Min. 40 Secs. 7.00 METS. 80% THR. BP- 135/60. Inconclusive test    EAR TUBE REMOVAL Left     ECHO - CONVERTED  03/03/2020    EF 60%. Mild MR.    ECHO - CONVERTED  09/14/2023    EF 65%. Mild MR. RVSP- 39 mmHg    ORIF FEMUR FRACTURE Left     US CAROTID UNILATERAL  03/03/2020    No sig lesions       Current Outpatient Medications   Medication Sig Dispense Refill    albuterol sulfate  (90 Base) MCG/ACT inhaler  "ProAir HFA 90 mcg/actuation aerosol inhaler      buprenorphine-naloxone (SUBOXONE) 8-2 MG per SL tablet Place 2 tablets under the tongue Daily.      famotidine (PEPCID) 20 MG tablet 1 tablet.      metoprolol succinate XL (TOPROL-XL) 50 MG 24 hr tablet Take 1 tablet by mouth Daily. 90 tablet 2    olmesartan (Benicar) 20 MG tablet Take 1 tablet by mouth Daily. 90 tablet 2     No current facility-administered medications for this visit.       Other    Past Medical History:   Diagnosis Date    Back pain     Lumbar    COPD (chronic obstructive pulmonary disease)     Heart murmur     History of ear surgery     Hypertension     Leg fracture     s/p steel daniela, then later removed       Social History     Socioeconomic History    Marital status:    Tobacco Use    Smoking status: Every Day     Current packs/day: 1.50     Average packs/day: 1.5 packs/day for 38.0 years (57.0 ttl pk-yrs)     Types: Cigarettes    Smokeless tobacco: Never   Vaping Use    Vaping status: Never Used   Substance and Sexual Activity    Alcohol use: Never    Drug use: Never    Sexual activity: Defer       Family History   Problem Relation Age of Onset    Diabetes Mother     Cancer Father     Heart attack Father         MI at early age,  at 55 with cancer, MI prior to t    Heart attack Sister         MI at 40, s/p stenting    No Known Problems Brother     Heart disease Sister         open heart due to \" hole in her heart    Cancer Sister     No Known Problems Sister     No Known Problems Sister     No Known Problems Daughter     No Known Problems Daughter     No Known Problems Son        Review of Systems   Constitutional: Negative for malaise/fatigue and night sweats.   Cardiovascular:  Positive for dyspnea on exertion. Negative for chest pain, claudication, irregular heartbeat, leg swelling, near-syncope, orthopnea, palpitations and syncope.   Respiratory:  Positive for shortness of breath. Negative for cough and wheezing.  " "  Musculoskeletal:  Negative for back pain, joint pain and stiffness.   Gastrointestinal:  Negative for anorexia, heartburn, nausea and vomiting.   Genitourinary:  Negative for dysuria, hematuria, hesitancy and nocturia.   Neurological:  Negative for dizziness, headaches and light-headedness.   Psychiatric/Behavioral:  Negative for depression and memory loss. The patient is not nervous/anxious.            Objective     /68 (BP Location: Left arm, Patient Position: Sitting)   Pulse 98   Ht 172.7 cm (68\")   Wt 48.4 kg (106 lb 9.6 oz)   BMI 16.21 kg/m²     Constitutional:       Appearance: Frail.   Eyes:      Pupils: Pupils are equal, round, and reactive to light.   HENT:      Nose: Nose normal.   Pulmonary:      Breath sounds: Normal breath sounds.      Comments: Diminished throughout  Cardiovascular:      PMI at left midclavicular line. Normal rate. Regular rhythm.      Murmurs: There is a systolic murmur.   Abdominal:      Palpations: Abdomen is soft.   Musculoskeletal: Normal range of motion.      Cervical back: Normal range of motion and neck supple. Skin:     General: Skin is warm and dry.   Neurological:      Mental Status: Alert.         Procedures         Diagnoses and all orders for this visit:    1. Essential hypertension (Primary)    2. Palpitations    3. Shortness of breath    4. Smoking    Other orders  -     Discontinue: metoprolol succinate XL (TOPROL-XL) 25 MG 24 hr tablet; Take 1 tablet by mouth Daily.  Dispense: 90 tablet; Refill: 3  -     olmesartan (Benicar) 20 MG tablet; Take 1 tablet by mouth Daily.  Dispense: 90 tablet; Refill: 2  -     metoprolol succinate XL (TOPROL-XL) 50 MG 24 hr tablet; Take 1 tablet by mouth Daily.  Dispense: 90 tablet; Refill: 2             HTN: BP lower normal, will decrease benicar to 20mg daily and HR continues to increase, add back toprol at 50mg XL nightly.     Cardiac status: Stable. No new concerns. RVSP stable. No valve concerns/LV dysfunction noted.    "   COPD: SOA is noted, similar to prior. Continues inhaler therapy. Followed by pulmonary, still followed by Dr. Alaniz. Encouraged to discuss chest CT with Katty.     Tobacco abuse: Unable to quit smoking at present. Weight loss continues to be of concern. Strongly urged to keep pulmonary follow ups.    BMI around 16.21, with slight weight gain noted. High protein diet urged. Patient encouraged to discuss weight gain supplement with you.     Labs with PCP, unsure of last check.     Refills per request.     6 month follow up advised, or sooner if needed.           Problems Addressed this Visit          Cardiac and Vasculature    Essential hypertension - Primary    Relevant Medications    olmesartan (Benicar) 20 MG tablet    metoprolol succinate XL (TOPROL-XL) 50 MG 24 hr tablet       Pulmonary and Pneumonias    Shortness of breath       Tobacco    Smoking     Other Visit Diagnoses       Palpitations              Diagnoses         Codes Comments    Essential hypertension    -  Primary ICD-10-CM: I10  ICD-9-CM: 401.9     Palpitations     ICD-10-CM: R00.2  ICD-9-CM: 785.1     Shortness of breath     ICD-10-CM: R06.02  ICD-9-CM: 786.05     Smoking     ICD-10-CM: F17.200  ICD-9-CM: 305.1                   Fredrick Henao  reports that he has been smoking cigarettes. He has a 57 pack-year smoking history. He has never used smokeless tobacco. I have educated him on the risk of diseases from using tobacco prodcuts.     I advised him to quit and he is not willing to quit.    I spent 3  minutes counseling the patient.                 Electronically signed by TRAM Jones March 26, 2024 12:33 EDT

## 2024-04-08 ENCOUNTER — TELEPHONE (OUTPATIENT)
Dept: CARDIOLOGY | Facility: CLINIC | Age: 54
End: 2024-04-08
Payer: COMMERCIAL

## 2024-04-08 NOTE — TELEPHONE ENCOUNTER
Caller: EMA FARRAR    Relationship to patient: Emergency Contact    Best call back number: [phone]      New or established patient?  [] New  [x] Established    Date of discharge: 04.06.24    Facility discharged from: [facility]    Diagnosis/Symptoms: ON LIFE SUPPORT FOR DIFFICULTY BREATHING AND COLLAPSED LUNG    Length of stay (If applicable): 8 DAYS    Specialty Only: Did you see a [facility] provider?    [] Yes  [x] No      PATIENT WAS TOLD TO FOLLOW UP IN 1-2 WEEKS.

## 2024-04-08 NOTE — TELEPHONE ENCOUNTER
No records are available, but appears based on this, needs PCP and pulmonary follow up, not cardiology, but I will wait until records scanned in.

## 2024-04-17 NOTE — TELEPHONE ENCOUNTER
Caller: EMA FARRAR    Relationship: Emergency Contact    Best call back number: [phone]    What is the best time to reach you: ANYTIME    Who are you requesting to speak with (clinical staff, provider,  specific staff member): CLINICAL STAFF    Do you know the name of the person who called: EMA FARRAR    What was the call regarding: PATIENT WOULD LIKE TO BE SEEN SOONER IF POSSIBLE AS HE IS EXPERIENCING LOW BLOOD PRESSURE.

## 2024-04-17 NOTE — TELEPHONE ENCOUNTER
PCP called and talked with Jennifer, requesting pt be seen ASAP due to low BP. We had a cancellation tomorrow, she moved his follow up to then. She advised patient's wife.

## 2024-04-18 ENCOUNTER — OFFICE VISIT (OUTPATIENT)
Dept: CARDIOLOGY | Facility: CLINIC | Age: 54
End: 2024-04-18
Payer: COMMERCIAL

## 2024-04-18 VITALS
HEIGHT: 68 IN | SYSTOLIC BLOOD PRESSURE: 90 MMHG | BODY MASS INDEX: 14.85 KG/M2 | HEART RATE: 75 BPM | DIASTOLIC BLOOD PRESSURE: 60 MMHG | WEIGHT: 98 LBS

## 2024-04-18 DIAGNOSIS — R06.02 SHORTNESS OF BREATH: Primary | ICD-10-CM

## 2024-04-18 DIAGNOSIS — F17.200 SMOKING: ICD-10-CM

## 2024-04-18 DIAGNOSIS — R42 DIZZINESS: ICD-10-CM

## 2024-04-18 DIAGNOSIS — I10 ESSENTIAL HYPERTENSION: ICD-10-CM

## 2024-04-18 DIAGNOSIS — I50.33 ACUTE ON CHRONIC DIASTOLIC CHF (CONGESTIVE HEART FAILURE): ICD-10-CM

## 2024-04-18 DIAGNOSIS — R63.4 WEIGHT LOSS: ICD-10-CM

## 2024-04-18 RX ORDER — POTASSIUM CHLORIDE 20 MEQ/1
20 TABLET, EXTENDED RELEASE ORAL DAILY
COMMUNITY

## 2024-04-18 RX ORDER — VERICIGUAT 2.5 MG/1
2.5 TABLET, FILM COATED ORAL DAILY
Qty: 30 TABLET | Refills: 6 | Status: SHIPPED | OUTPATIENT
Start: 2024-04-18

## 2024-04-18 RX ORDER — DILTIAZEM HYDROCHLORIDE 120 MG/1
120 CAPSULE, COATED, EXTENDED RELEASE ORAL DAILY
Qty: 30 CAPSULE | Refills: 8 | OUTPATIENT
Start: 2024-04-18

## 2024-04-18 RX ORDER — BUDESONIDE AND FORMOTEROL FUMARATE DIHYDRATE 160; 4.5 UG/1; UG/1
2 AEROSOL RESPIRATORY (INHALATION)
COMMUNITY

## 2024-04-18 RX ORDER — TIOTROPIUM BROMIDE 18 UG/1
1 CAPSULE ORAL; RESPIRATORY (INHALATION)
COMMUNITY

## 2024-04-18 RX ORDER — METOPROLOL SUCCINATE 25 MG/1
25 TABLET, EXTENDED RELEASE ORAL DAILY
COMMUNITY

## 2024-04-18 RX ORDER — SACUBITRIL AND VALSARTAN 24; 26 MG/1; MG/1
1 TABLET, FILM COATED ORAL 2 TIMES DAILY
COMMUNITY

## 2024-04-18 RX ORDER — FUROSEMIDE 40 MG/1
40 TABLET ORAL DAILY
COMMUNITY

## 2024-04-18 RX ORDER — DAPAGLIFLOZIN 10 MG/1
1 TABLET, FILM COATED ORAL DAILY
COMMUNITY

## 2024-04-18 NOTE — LETTER
April 18, 2024       No Recipients    Patient: Fredrick Henao   YOB: 1970   Date of Visit: 4/18/2024     Dear TRAM Rdz:       Thank you for referring Fredrick Henao to me for evaluation. Below are the relevant portions of my assessment and plan of care.    If you have questions, please do not hesitate to call me. I look forward to following Fredrick along with you.         Sincerely,        Faisal Meza MD        CC:   No Recipients    Faisal Meza MD  04/18/24 1337  Sign when Signing Visit  Chief Complaint   Patient presents with   • Hospital Follow Up Visit     Patient had cath on 03/30/24 with Dr. Bull, did not have any stenting. Patient had echo on 03/31/24 with EF showing 30-35%, no LifeVest was ordered at that time. Patient was discharged on 04/06/24, discharge summary, echo, and cath are in chart under media. Patient is not on aspirin.   • Med Refill     Did not bring medication list with visit. Called [facility] to get list sent over, I put in medications and went over verbally with patient.   • Low BP     Patient states that BP has been running low. Metoprolol was changed to 25 mg daily, but patient is not currently taking due to low BP.       CARDIAC COMPLAINTS  dyspnea and fatigue        Subjective  Fredrick Henao is a 53 y.o. male came in today for his hospital follow-up visit.  He has history of significant hypertension and COPD.  He underwent cardiac workup in the past which showed normal LV function mild pulmonary hypertension and he also underwent regular stress test since insurance refused nuclear.  It was inconclusive he was seen here on March 26 with increasing shortness of breath.  It was felt that it is due to his lungs.  He apparently got admitted after third visit in an few weeks.  His troponin was elevated and he underwent emergency cardiac catheterization by another cardiologist.  According to the report the coronaries were normal the EF is  lower limit of normal and apical and septal hypokinesis seen.  He then continued to have increasing shortness of breath and got intubated since he was positive for influenza during his previous visit he was treated with Tamiflu and broad-spectrum antibiotics.  He had a echocardiogram done and the same cardiologist read as having EF of 35% and mild pulm hypertension.  His ARB was changed to Entresto.  Farxiga was added.  LifeVest was not placed.  He is now referred back for further evaluation.  He apparently completely quit smoking since then.  He still continues to have shortness of breath.  He continued to lose weight.  In the last 1 month he has lost about 8 pounds.              Cardiac History  Past Surgical History:   Procedure Laterality Date   • CARDIAC CATHETERIZATION  03/30/2024    - Normal coronaries. EF 50%. anteroapical WMA   • CARDIOVASCULAR STRESS TEST  09/27/2021    R.Stress- 5 Min. 40 Secs. 7.00 METS. 80% THR. BP- 135/60. Inconclusive test   • EAR TUBE REMOVAL Left    • ECHO - CONVERTED  03/03/2020    EF 60%. Mild MR.   • ECHO - CONVERTED  09/14/2023    EF 65%. Mild MR. RVSP- 39 mmHg   • ECHO - CONVERTED  03/31/2024    @ [facility].- TLS. EF 35%. Mild MR. RVSP- 36 mmHg   • ORIF FEMUR FRACTURE Left    • US CAROTID UNILATERAL  03/03/2020    No sig lesions       Current Outpatient Medications   Medication Sig Dispense Refill   • albuterol sulfate  (90 Base) MCG/ACT inhaler ProAir HFA 90 mcg/actuation aerosol inhaler     • budesonide-formoterol (SYMBICORT) 160-4.5 MCG/ACT inhaler Inhale 2 puffs 2 (Two) Times a Day.     • buprenorphine-naloxone (SUBOXONE) 8-2 MG per SL tablet Place 2 tablets under the tongue Daily.     • dapagliflozin Propanediol (Farxiga) 10 MG tablet Take 10 mg by mouth Daily.     • famotidine (PEPCID) 20 MG tablet 1 tablet.     • furosemide (LASIX) 40 MG tablet Take 1 tablet by mouth Daily.     • metoprolol succinate XL (TOPROL-XL) 25 MG 24 hr tablet Take 1 tablet by  "mouth Daily.     • O2 (OXYGEN) Inhale 2 L/min 1 (One) Time.     • potassium chloride (KLOR-CON M20) 20 MEQ CR tablet Take 1 tablet by mouth Daily.     • sacubitril-valsartan (Entresto) 24-26 MG tablet Take 1 tablet by mouth 2 (Two) Times a Day.     • tiotropium (SPIRIVA) 18 MCG per inhalation capsule Place 1 capsule into inhaler and inhale Daily.     • Vericiguat (Verquvo) 2.5 MG tablet Take 1 tablet by mouth Daily. 30 tablet 6     No current facility-administered medications for this visit.       Allergies  :  Other       Past Medical History:   Diagnosis Date   • Back pain     Lumbar   • COPD (chronic obstructive pulmonary disease)    • Heart murmur    • History of ear surgery    • Hypertension    • Leg fracture     s/p steel daniela, then later removed       Social History     Socioeconomic History   • Marital status:    Tobacco Use   • Smoking status: Former     Current packs/day: 1.50     Average packs/day: 1.5 packs/day for 38.0 years (57.0 ttl pk-yrs)     Types: Cigarettes   • Smokeless tobacco: Never   Vaping Use   • Vaping status: Never Used   Substance and Sexual Activity   • Alcohol use: Never   • Drug use: Never   • Sexual activity: Defer       Family History   Problem Relation Age of Onset   • Diabetes Mother    • Cancer Father    • Heart attack Father         MI at early age,  at 55 with cancer, MI prior to t   • Heart attack Sister         MI at 40, s/p stenting   • No Known Problems Brother    • Heart disease Sister         open heart due to \" hole in her heart   • Cancer Sister    • No Known Problems Sister    • No Known Problems Sister    • No Known Problems Daughter    • No Known Problems Daughter    • No Known Problems Son        Review of Systems   Constitutional: Positive for malaise/fatigue and weight loss. Negative for decreased appetite.   HENT:  Negative for congestion and sore throat.    Eyes:  Negative for blurred vision, double vision and visual disturbance.   Cardiovascular:  " "Positive for dyspnea on exertion. Negative for chest pain.   Respiratory:  Negative for shortness of breath and snoring.    Endocrine: Negative for cold intolerance and heat intolerance.   Hematologic/Lymphatic: Negative for adenopathy. Does not bruise/bleed easily.   Skin:  Negative for itching, nail changes and skin cancer.   Musculoskeletal:  Negative for arthritis and myalgias.   Gastrointestinal:  Negative for abdominal pain, dysphagia and heartburn.   Genitourinary:  Negative for bladder incontinence and frequency.   Neurological:  Positive for dizziness. Negative for seizures and vertigo.   Psychiatric/Behavioral:  Negative for altered mental status.    Allergic/Immunologic: Negative for environmental allergies and hives.     Diabetes- No  Thyroid- normal    Objective    BP 90/60 (BP Location: Left arm)   Pulse 75   Ht 172.7 cm (67.99\")   Wt 44.5 kg (98 lb)   BMI 14.90 kg/m²     Vitals and nursing note reviewed.   Constitutional:       Appearance: Healthy appearance. Not in distress.   Eyes:      Conjunctiva/sclera: Conjunctivae normal.      Pupils: Pupils are equal, round, and reactive to light.   HENT:      Head: Normocephalic.   Pulmonary:      Effort: Pulmonary effort is normal.      Breath sounds: Normal breath sounds.   Cardiovascular:      PMI at left midclavicular line. Normal rate. Regular rhythm.      Murmurs: There is a grade 3/6 high frequency blowing holosystolic murmur at the apex.   Abdominal:      General: Bowel sounds are normal.      Palpations: Abdomen is soft.   Musculoskeletal: Normal range of motion.      Cervical back: Normal range of motion and neck supple. Skin:     General: Skin is warm and dry.   Neurological:      Mental Status: Alert, oriented to person, place, and time and oriented to person, place and time.     ECG 12 Lead    Date/Time: 4/18/2024 1:36 PM  Performed by: Faisal Meza MD    Authorized by: Faisal Meza MD  Comparison: compared with previous ECG " from 3/30/2024  Comparison to previous ECG: New T wave inversion  Rhythm: sinus rhythm  Rate: normal  QRS axis: normal  Other findings: non-specific ST-T wave changes and T wave abnormality    Clinical impression: abnormal EKG              @ASSESSMENT/PLAN@  BMI is below normal parameters (malnutrition). Recommendations: treating the underlying disease process     Diagnoses and all orders for this visit:    1. Shortness of breath (Primary)  -     Vericiguat (Verquvo) 2.5 MG tablet; Take 1 tablet by mouth Daily.  Dispense: 30 tablet; Refill: 6  -     Adult Transthoracic Echo Complete W/ Cont if Necessary Per Protocol; Future  -     BNP; Future  -     Lipid Panel; Future    2. Essential hypertension  -     Comprehensive Metabolic Panel; Future    3. Dizziness  -     CBC & Differential; Future    4. Smoking    5. Acute on chronic diastolic CHF (congestive heart failure)  -     Vericiguat (Verquvo) 2.5 MG tablet; Take 1 tablet by mouth Daily.  Dispense: 30 tablet; Refill: 6  -     Adult Transthoracic Echo Complete W/ Cont if Necessary Per Protocol; Future  -     Lipid Panel; Future    6. Weight loss       At baseline his heart rate is stable but his blood pressure is lower limit of normal.  His EKG shows sinus rhythm with T wave changes in the inferolateral leads.  His clinical examination reveals a BMI of 15.  His cardiovascular examination reveals systolic murmur at the mitral area    Regarding his shortness of breath, it is a combination of COPD and cardiac.  At this time I cannot increase Entresto because of the blood pressure.  I have advised him to check the electrolytes and BNP level.  I advised him to undergo a repeat echocardiogram because of the conflicting reports regarding the EF.  If he does have apical wall motion abnormality, it could be secondary to Takotsubo syndrome.  He may need to be on Zocor Zoloft in that case.  I will review both his cath films and the echo images.  At this time I started him on  Verquvo at 2.5 mg once a day.    Regarding his history of hypertension now he is hypotensive.  Encouraged him to increase his fluid intake and continue the medication.  Need his electrolytes checked.    Regarding his history of dizziness, it could be from the blood pressure.  Will check his blood count also    Regarding his smoking history, he has completely quit smoking.  Encouraged him to continue to do so    Regarding his heart failure, will repeat an echocardiogram to reevaluate the EF, check BNP and add Verquvo    Regarding his weight loss, it is little worrisome.  He is advised to talk to you about it.  He may need more workup    I will see him back in 6 months or sooner if needed                      Electronically signed by Faisal Meza MD April 18, 2024 13:34 EDT

## 2024-04-18 NOTE — PROGRESS NOTES
Chief Complaint   Patient presents with   • Hospital Follow Up Visit     Patient had cath on 03/30/24 with Dr. Bull, did not have any stenting. Patient had echo on 03/31/24 with EF showing 30-35%, no LifeVest was ordered at that time. Patient was discharged on 04/06/24, discharge summary, echo, and cath are in chart under media. Patient is not on aspirin.   • Med Refill     Did not bring medication list with visit. Called [facility] to get list sent over, I put in medications and went over verbally with patient.   • Low BP     Patient states that BP has been running low. Metoprolol was changed to 25 mg daily, but patient is not currently taking due to low BP.       CARDIAC COMPLAINTS  dyspnea and fatigue        Subjective   Fredrick Henao is a 53 y.o. male came in today for his hospital follow-up visit.  He has history of significant hypertension and COPD.  He underwent cardiac workup in the past which showed normal LV function mild pulmonary hypertension and he also underwent regular stress test since insurance refused nuclear.  It was inconclusive he was seen here on March 26 with increasing shortness of breath.  It was felt that it is due to his lungs.  He apparently got admitted after third visit in an few weeks.  His troponin was elevated and he underwent emergency cardiac catheterization by another cardiologist.  According to the report the coronaries were normal the EF is lower limit of normal and apical and septal hypokinesis seen.  He then continued to have increasing shortness of breath and got intubated since he was positive for influenza during his previous visit he was treated with Tamiflu and broad-spectrum antibiotics.  He had a echocardiogram done and the same cardiologist read as having EF of 35% and mild pulm hypertension.  His ARB was changed to Entresto.  Farxiga was added.  LifeVest was not placed.  He is now referred back for further evaluation.  He apparently completely quit  smoking since then.  He still continues to have shortness of breath.  He continued to lose weight.  In the last 1 month he has lost about 8 pounds.              Cardiac History  Past Surgical History:   Procedure Laterality Date   • CARDIAC CATHETERIZATION  03/30/2024    - Normal coronaries. EF 50%. anteroapical WMA   • CARDIOVASCULAR STRESS TEST  09/27/2021    R.Stress- 5 Min. 40 Secs. 7.00 METS. 80% THR. BP- 135/60. Inconclusive test   • EAR TUBE REMOVAL Left    • ECHO - CONVERTED  03/03/2020    EF 60%. Mild MR.   • ECHO - CONVERTED  09/14/2023    EF 65%. Mild MR. RVSP- 39 mmHg   • ECHO - CONVERTED  03/31/2024    @ [facility].- TLS. EF 35%. Mild MR. RVSP- 36 mmHg   • ORIF FEMUR FRACTURE Left    • US CAROTID UNILATERAL  03/03/2020    No sig lesions       Current Outpatient Medications   Medication Sig Dispense Refill   • albuterol sulfate  (90 Base) MCG/ACT inhaler ProAir HFA 90 mcg/actuation aerosol inhaler     • budesonide-formoterol (SYMBICORT) 160-4.5 MCG/ACT inhaler Inhale 2 puffs 2 (Two) Times a Day.     • buprenorphine-naloxone (SUBOXONE) 8-2 MG per SL tablet Place 2 tablets under the tongue Daily.     • dapagliflozin Propanediol (Farxiga) 10 MG tablet Take 10 mg by mouth Daily.     • famotidine (PEPCID) 20 MG tablet 1 tablet.     • furosemide (LASIX) 40 MG tablet Take 1 tablet by mouth Daily.     • metoprolol succinate XL (TOPROL-XL) 25 MG 24 hr tablet Take 1 tablet by mouth Daily.     • O2 (OXYGEN) Inhale 2 L/min 1 (One) Time.     • potassium chloride (KLOR-CON M20) 20 MEQ CR tablet Take 1 tablet by mouth Daily.     • sacubitril-valsartan (Entresto) 24-26 MG tablet Take 1 tablet by mouth 2 (Two) Times a Day.     • tiotropium (SPIRIVA) 18 MCG per inhalation capsule Place 1 capsule into inhaler and inhale Daily.     • Vericiguat (Verquvo) 2.5 MG tablet Take 1 tablet by mouth Daily. 30 tablet 6     No current facility-administered medications for this visit.       Allergies  :  Other      "  Past Medical History:   Diagnosis Date   • Back pain     Lumbar   • COPD (chronic obstructive pulmonary disease)    • Heart murmur    • History of ear surgery    • Hypertension    • Leg fracture     s/p steel daniela, then later removed       Social History     Socioeconomic History   • Marital status:    Tobacco Use   • Smoking status: Former     Current packs/day: 1.50     Average packs/day: 1.5 packs/day for 38.0 years (57.0 ttl pk-yrs)     Types: Cigarettes   • Smokeless tobacco: Never   Vaping Use   • Vaping status: Never Used   Substance and Sexual Activity   • Alcohol use: Never   • Drug use: Never   • Sexual activity: Defer       Family History   Problem Relation Age of Onset   • Diabetes Mother    • Cancer Father    • Heart attack Father         MI at early age,  at 55 with cancer, MI prior to t   • Heart attack Sister         MI at 40, s/p stenting   • No Known Problems Brother    • Heart disease Sister         open heart due to \" hole in her heart   • Cancer Sister    • No Known Problems Sister    • No Known Problems Sister    • No Known Problems Daughter    • No Known Problems Daughter    • No Known Problems Son        Review of Systems   Constitutional: Positive for malaise/fatigue and weight loss. Negative for decreased appetite.   HENT:  Negative for congestion and sore throat.    Eyes:  Negative for blurred vision, double vision and visual disturbance.   Cardiovascular:  Positive for dyspnea on exertion. Negative for chest pain.   Respiratory:  Negative for shortness of breath and snoring.    Endocrine: Negative for cold intolerance and heat intolerance.   Hematologic/Lymphatic: Negative for adenopathy. Does not bruise/bleed easily.   Skin:  Negative for itching, nail changes and skin cancer.   Musculoskeletal:  Negative for arthritis and myalgias.   Gastrointestinal:  Negative for abdominal pain, dysphagia and heartburn.   Genitourinary:  Negative for bladder incontinence and frequency. " "  Neurological:  Positive for dizziness. Negative for seizures and vertigo.   Psychiatric/Behavioral:  Negative for altered mental status.    Allergic/Immunologic: Negative for environmental allergies and hives.     Diabetes- No  Thyroid- normal    Objective     BP 90/60 (BP Location: Left arm)   Pulse 75   Ht 172.7 cm (67.99\")   Wt 44.5 kg (98 lb)   BMI 14.90 kg/m²     Vitals and nursing note reviewed.   Constitutional:       Appearance: Healthy appearance. Not in distress.   Eyes:      Conjunctiva/sclera: Conjunctivae normal.      Pupils: Pupils are equal, round, and reactive to light.   HENT:      Head: Normocephalic.   Pulmonary:      Effort: Pulmonary effort is normal.      Breath sounds: Normal breath sounds.   Cardiovascular:      PMI at left midclavicular line. Normal rate. Regular rhythm.      Murmurs: There is a grade 3/6 high frequency blowing holosystolic murmur at the apex.   Abdominal:      General: Bowel sounds are normal.      Palpations: Abdomen is soft.   Musculoskeletal: Normal range of motion.      Cervical back: Normal range of motion and neck supple. Skin:     General: Skin is warm and dry.   Neurological:      Mental Status: Alert, oriented to person, place, and time and oriented to person, place and time.     ECG 12 Lead    Date/Time: 4/18/2024 1:36 PM  Performed by: Faisal Meza MD    Authorized by: Faisal Meza MD  Comparison: compared with previous ECG from 3/30/2024  Comparison to previous ECG: New T wave inversion  Rhythm: sinus rhythm  Rate: normal  QRS axis: normal  Other findings: non-specific ST-T wave changes and T wave abnormality    Clinical impression: abnormal EKG              @ASSESSMENT/PLAN@  BMI is below normal parameters (malnutrition). Recommendations: treating the underlying disease process     Diagnoses and all orders for this visit:    1. Shortness of breath (Primary)  -     Vericiguat (Verquvo) 2.5 MG tablet; Take 1 tablet by mouth Daily.  Dispense: " 30 tablet; Refill: 6  -     Adult Transthoracic Echo Complete W/ Cont if Necessary Per Protocol; Future  -     BNP; Future  -     Lipid Panel; Future    2. Essential hypertension  -     Comprehensive Metabolic Panel; Future    3. Dizziness  -     CBC & Differential; Future    4. Smoking    5. Acute on chronic diastolic CHF (congestive heart failure)  -     Vericiguat (Verquvo) 2.5 MG tablet; Take 1 tablet by mouth Daily.  Dispense: 30 tablet; Refill: 6  -     Adult Transthoracic Echo Complete W/ Cont if Necessary Per Protocol; Future  -     Lipid Panel; Future    6. Weight loss       At baseline his heart rate is stable but his blood pressure is lower limit of normal.  His EKG shows sinus rhythm with T wave changes in the inferolateral leads.  His clinical examination reveals a BMI of 15.  His cardiovascular examination reveals systolic murmur at the mitral area    Regarding his shortness of breath, it is a combination of COPD and cardiac.  At this time I cannot increase Entresto because of the blood pressure.  I have advised him to check the electrolytes and BNP level.  I advised him to undergo a repeat echocardiogram because of the conflicting reports regarding the EF.  If he does have apical wall motion abnormality, it could be secondary to Takotsubo syndrome.  He may need to be on Zocor Zoloft in that case.  I will review both his cath films and the echo images.  At this time I started him on Verquvo at 2.5 mg once a day.    Regarding his history of hypertension now he is hypotensive.  Encouraged him to increase his fluid intake and continue the medication.  Need his electrolytes checked.    Regarding his history of dizziness, it could be from the blood pressure.  Will check his blood count also    Regarding his smoking history, he has completely quit smoking.  Encouraged him to continue to do so    Regarding his heart failure, will repeat an echocardiogram to reevaluate the EF, check BNP and add  Verquvo    Regarding his weight loss, it is little worrisome.  He is advised to talk to you about it.  He may need more workup    I will see him back in 6 months or sooner if needed                      Electronically signed by Faisal Meza MD April 18, 2024 13:34 EDT

## 2024-04-22 ENCOUNTER — TELEPHONE (OUTPATIENT)
Dept: CARDIOLOGY | Facility: CLINIC | Age: 54
End: 2024-04-22
Payer: COMMERCIAL

## 2024-04-25 ENCOUNTER — HOSPITAL ENCOUNTER (OUTPATIENT)
Dept: CARDIOLOGY | Facility: HOSPITAL | Age: 54
Discharge: HOME | End: 2024-04-25
Payer: COMMERCIAL

## 2024-04-25 ENCOUNTER — LAB (OUTPATIENT)
Dept: LAB | Facility: HOSPITAL | Age: 54
End: 2024-04-25
Payer: COMMERCIAL

## 2024-04-25 DIAGNOSIS — I10 ESSENTIAL HYPERTENSION: ICD-10-CM

## 2024-04-25 DIAGNOSIS — I50.33 ACUTE ON CHRONIC DIASTOLIC CHF (CONGESTIVE HEART FAILURE): ICD-10-CM

## 2024-04-25 DIAGNOSIS — R06.02 SHORTNESS OF BREATH: ICD-10-CM

## 2024-04-25 DIAGNOSIS — R42 DIZZINESS: ICD-10-CM

## 2024-04-25 LAB
ALBUMIN SERPL-MCNC: 4 G/DL (ref 3.5–5.2)
ALBUMIN/GLOB SERPL: 1.3 G/DL
ALP SERPL-CCNC: 124 U/L (ref 39–117)
ALT SERPL W P-5'-P-CCNC: 12 U/L (ref 1–41)
ANION GAP SERPL CALCULATED.3IONS-SCNC: 9.6 MMOL/L (ref 5–15)
AST SERPL-CCNC: 18 U/L (ref 1–40)
BASOPHILS # BLD AUTO: 0.07 10*3/MM3 (ref 0–0.2)
BASOPHILS NFR BLD AUTO: 1.5 % (ref 0–1.5)
BH CV ECHO MEAS - ACS: 2.5 CM
BH CV ECHO MEAS - AO MAX PG: 3.5 MMHG
BH CV ECHO MEAS - AO MEAN PG: 2.19 MMHG
BH CV ECHO MEAS - AO ROOT DIAM: 3.1 CM
BH CV ECHO MEAS - AO V2 MAX: 93.7 CM/SEC
BH CV ECHO MEAS - AO V2 VTI: 15.3 CM
BH CV ECHO MEAS - EDV(CUBED): 43.6 ML
BH CV ECHO MEAS - EDV(MOD-SP4): 33.1 ML
BH CV ECHO MEAS - EF(MOD-SP4): 58.9 %
BH CV ECHO MEAS - EF_3D-VOL: 57 %
BH CV ECHO MEAS - ESV(CUBED): 9 ML
BH CV ECHO MEAS - ESV(MOD-SP4): 13.6 ML
BH CV ECHO MEAS - FS: 40.9 %
BH CV ECHO MEAS - IVS/LVPW: 0.86 CM
BH CV ECHO MEAS - IVSD: 0.83 CM
BH CV ECHO MEAS - LA DIMENSION: 2.41 CM
BH CV ECHO MEAS - LAT PEAK E' VEL: 12.8 CM/SEC
BH CV ECHO MEAS - LV DIASTOLIC VOL/BSA (35-75): 22.2 CM2
BH CV ECHO MEAS - LV MASS(C)D: 89 GRAMS
BH CV ECHO MEAS - LV SYSTOLIC VOL/BSA (12-30): 9.1 CM2
BH CV ECHO MEAS - LVIDD: 3.5 CM
BH CV ECHO MEAS - LVIDS: 2.08 CM
BH CV ECHO MEAS - LVPWD: 0.96 CM
BH CV ECHO MEAS - MED PEAK E' VEL: 8.7 CM/SEC
BH CV ECHO MEAS - MV A MAX VEL: 66 CM/SEC
BH CV ECHO MEAS - MV DEC SLOPE: 159.9 CM/SEC2
BH CV ECHO MEAS - MV E MAX VEL: 57.8 CM/SEC
BH CV ECHO MEAS - MV E/A: 0.88
BH CV ECHO MEAS - RAP SYSTOLE: 10 MMHG
BH CV ECHO MEAS - RVDD: 1.94 CM
BH CV ECHO MEAS - RVSP: 32.8 MMHG
BH CV ECHO MEAS - SV(MOD-SP4): 19.5 ML
BH CV ECHO MEAS - SVI(MOD-SP4): 13.1 ML/M2
BH CV ECHO MEAS - TR MAX PG: 22.8 MMHG
BH CV ECHO MEAS - TR MAX VEL: 238.9 CM/SEC
BH CV ECHO MEASUREMENTS AVERAGE E/E' RATIO: 5.38
BH CV XLRA - RV BASE: 2.6 CM
BH CV XLRA - RV LENGTH: 6.9 CM
BH CV XLRA - RV MID: 2.07 CM
BILIRUB SERPL-MCNC: 0.3 MG/DL (ref 0–1.2)
BUN SERPL-MCNC: 9 MG/DL (ref 6–20)
BUN/CREAT SERPL: 11.7 (ref 7–25)
CALCIUM SPEC-SCNC: 9.9 MG/DL (ref 8.6–10.5)
CHLORIDE SERPL-SCNC: 94 MMOL/L (ref 98–107)
CHOLEST SERPL-MCNC: 198 MG/DL (ref 0–200)
CO2 SERPL-SCNC: 29.4 MMOL/L (ref 22–29)
CREAT SERPL-MCNC: 0.77 MG/DL (ref 0.76–1.27)
DEPRECATED RDW RBC AUTO: 48.3 FL (ref 37–54)
EGFRCR SERPLBLD CKD-EPI 2021: 108.4 ML/MIN/1.73
EOSINOPHIL # BLD AUTO: 0.09 10*3/MM3 (ref 0–0.4)
EOSINOPHIL NFR BLD AUTO: 1.9 % (ref 0.3–6.2)
ERYTHROCYTE [DISTWIDTH] IN BLOOD BY AUTOMATED COUNT: 14.7 % (ref 12.3–15.4)
GLOBULIN UR ELPH-MCNC: 3.1 GM/DL
GLUCOSE SERPL-MCNC: 101 MG/DL (ref 65–99)
HCT VFR BLD AUTO: 39.2 % (ref 37.5–51)
HDLC SERPL-MCNC: 66 MG/DL (ref 40–60)
HGB BLD-MCNC: 12.4 G/DL (ref 13–17.7)
IMM GRANULOCYTES # BLD AUTO: 0.01 10*3/MM3 (ref 0–0.05)
IMM GRANULOCYTES NFR BLD AUTO: 0.2 % (ref 0–0.5)
LDLC SERPL CALC-MCNC: 109 MG/DL (ref 0–100)
LDLC/HDLC SERPL: 1.61 {RATIO}
LYMPHOCYTES # BLD AUTO: 1.54 10*3/MM3 (ref 0.7–3.1)
LYMPHOCYTES NFR BLD AUTO: 32.3 % (ref 19.6–45.3)
MCH RBC QN AUTO: 28.4 PG (ref 26.6–33)
MCHC RBC AUTO-ENTMCNC: 31.6 G/DL (ref 31.5–35.7)
MCV RBC AUTO: 89.7 FL (ref 79–97)
MONOCYTES # BLD AUTO: 0.42 10*3/MM3 (ref 0.1–0.9)
MONOCYTES NFR BLD AUTO: 8.8 % (ref 5–12)
NEUTROPHILS NFR BLD AUTO: 2.64 10*3/MM3 (ref 1.7–7)
NEUTROPHILS NFR BLD AUTO: 55.3 % (ref 42.7–76)
NRBC BLD AUTO-RTO: 0 /100 WBC (ref 0–0.2)
NT-PROBNP SERPL-MCNC: 534.1 PG/ML (ref 0–900)
PLATELET # BLD AUTO: 322 10*3/MM3 (ref 140–450)
PMV BLD AUTO: 9.2 FL (ref 6–12)
POTASSIUM SERPL-SCNC: 5.3 MMOL/L (ref 3.5–5.2)
PROT SERPL-MCNC: 7.1 G/DL (ref 6–8.5)
RBC # BLD AUTO: 4.37 10*6/MM3 (ref 4.14–5.8)
SODIUM SERPL-SCNC: 133 MMOL/L (ref 136–145)
TRIGL SERPL-MCNC: 129 MG/DL (ref 0–150)
VLDLC SERPL-MCNC: 23 MG/DL (ref 5–40)
WBC NRBC COR # BLD AUTO: 4.77 10*3/MM3 (ref 3.4–10.8)

## 2024-04-25 PROCEDURE — 36415 COLL VENOUS BLD VENIPUNCTURE: CPT

## 2024-04-25 PROCEDURE — 83880 ASSAY OF NATRIURETIC PEPTIDE: CPT

## 2024-04-25 PROCEDURE — 80053 COMPREHEN METABOLIC PANEL: CPT

## 2024-04-25 PROCEDURE — 80061 LIPID PANEL: CPT

## 2024-04-25 PROCEDURE — 85025 COMPLETE CBC W/AUTO DIFF WBC: CPT

## 2024-04-25 PROCEDURE — 93306 TTE W/DOPPLER COMPLETE: CPT

## 2024-04-26 ENCOUNTER — TELEPHONE (OUTPATIENT)
Dept: CARDIOLOGY | Facility: CLINIC | Age: 54
End: 2024-04-26
Payer: COMMERCIAL

## 2024-04-26 DIAGNOSIS — I10 ESSENTIAL HYPERTENSION: Primary | ICD-10-CM

## 2024-04-26 RX ORDER — ROSUVASTATIN CALCIUM 10 MG/1
10 TABLET, COATED ORAL DAILY
Qty: 30 TABLET | Refills: 6 | Status: SHIPPED | OUTPATIENT
Start: 2024-04-26

## 2024-04-26 NOTE — TELEPHONE ENCOUNTER
----- Message from April N sent at 4/26/2024  6:46 AM EDT -----  Stop KCl  Recheck BMP in 1 week  Crestor 10 mg once a day

## 2024-04-26 NOTE — TELEPHONE ENCOUNTER
Patient aware to stop potassium.  Recheck lab next Thursday at [facility].  Add Crestor 10 mg daily. [facility].  Patient verbalized understanding.

## 2024-05-14 RX ORDER — DILTIAZEM HYDROCHLORIDE 120 MG/1
120 CAPSULE, COATED, EXTENDED RELEASE ORAL DAILY
Qty: 30 CAPSULE | Refills: 8 | OUTPATIENT
Start: 2024-05-14

## 2024-05-21 RX ORDER — DILTIAZEM HYDROCHLORIDE 120 MG/1
120 CAPSULE, COATED, EXTENDED RELEASE ORAL DAILY
Qty: 30 CAPSULE | Refills: 8 | OUTPATIENT
Start: 2024-05-21

## 2024-07-03 NOTE — TELEPHONE ENCOUNTER
Caller: EMA FARRAR    Relationship: Emergency Contact    Best call back number: [phone]    Requested Prescriptions:   Requested Prescriptions     Pending Prescriptions Disp Refills    sacubitril-valsartan (Entresto) 24-26 MG tablet 60 tablet      Sig: Take 1 tablet by mouth 2 (Two) Times a Day.    furosemide (LASIX) 40 MG tablet       Sig: Take 1 tablet by mouth Daily.        Pharmacy where request should be sent: [facility] [address] [phone] [facility] [phone]      Last office visit with prescribing clinician: 4/18/2024   Last telemedicine visit with prescribing clinician: Visit date not found   Next office visit with prescribing clinician: Visit date not found     Additional details provided by patient:     Does the patient have less than a 3 day supply:  [x] Yes  [] No    Would you like a call back once the refill request has been completed: [x] Yes [] No    If the office needs to give you a call back, can they leave a voicemail: [x] Yes [] No    Sami Villegas Rep   07/03/24 08:59 EDT

## 2024-07-05 NOTE — TELEPHONE ENCOUNTER
Caller: EMA FARRAR    Relationship: Emergency Contact    Best call back number: [phone]    What is the best time to reach you: ANYTIME    Who are you requesting to speak with (clinical staff, provider,  specific staff member): CLINICAL    Do you know the name of the person who called: N/A    What was the call regarding: PATIENT IS NOW OUT OF MEDICATION AND WAITING ON REFILL. PLEASE CALL THIS REFILL INTO [facility] IN PATIENT CHART.    Is it okay if the provider responds through MyChart: CALL

## 2024-07-08 RX ORDER — SACUBITRIL AND VALSARTAN 24; 26 MG/1; MG/1
1 TABLET, FILM COATED ORAL 2 TIMES DAILY
Qty: 180 TABLET | Refills: 3 | Status: SHIPPED | OUTPATIENT
Start: 2024-07-08

## 2024-07-08 RX ORDER — FUROSEMIDE 40 MG/1
40 TABLET ORAL DAILY
Qty: 90 TABLET | Refills: 3 | Status: SHIPPED | OUTPATIENT
Start: 2024-07-08

## 2024-10-02 ENCOUNTER — TELEPHONE (OUTPATIENT)
Dept: CARDIOLOGY | Facility: CLINIC | Age: 54
End: 2024-10-02
Payer: COMMERCIAL

## 2024-10-02 NOTE — TELEPHONE ENCOUNTER
Caller: EMA FARRAR    Relationship: Emergency Contact    Best call back number: [phone]     Which medication are you concerned about: ONE STARTS WITH A B, UNKNOWN MEDS     Who prescribed you this medication: DR. REGALADO OFFICE     What are your concerns: PT IS ON TWO HEART MEDS, WANTS TO MAKE SURE PT SHOULD STILL BE TAKING THESE- PLEASE ADVISE.

## 2024-10-02 NOTE — TELEPHONE ENCOUNTER
Patient's wife, Dulce, states that she did not know if patient was to stay on previously prescribed medication. She was made aware that we have sent in Entresto 24-26 mg BID, Verquvo 2.5 mg daily, furosemide 40 mg daily, and rosuvastatin 10 mg daily. She was made aware as far as I can see in our system, we have not made any changes.

## 2024-10-28 ENCOUNTER — OFFICE VISIT (OUTPATIENT)
Dept: CARDIOLOGY | Facility: CLINIC | Age: 54
End: 2024-10-28
Payer: COMMERCIAL

## 2024-10-28 VITALS
SYSTOLIC BLOOD PRESSURE: 105 MMHG | DIASTOLIC BLOOD PRESSURE: 60 MMHG | HEIGHT: 68 IN | WEIGHT: 98 LBS | HEART RATE: 97 BPM | RESPIRATION RATE: 18 BRPM | BODY MASS INDEX: 14.85 KG/M2

## 2024-10-28 DIAGNOSIS — Z87.891 FORMER SMOKER: ICD-10-CM

## 2024-10-28 DIAGNOSIS — I10 ESSENTIAL HYPERTENSION: Primary | ICD-10-CM

## 2024-10-28 DIAGNOSIS — J44.1 COPD WITH ACUTE EXACERBATION: ICD-10-CM

## 2024-10-28 DIAGNOSIS — I50.33 ACUTE ON CHRONIC DIASTOLIC CHF (CONGESTIVE HEART FAILURE): ICD-10-CM

## 2024-10-28 DIAGNOSIS — R63.4 WEIGHT LOSS: ICD-10-CM

## 2024-10-28 DIAGNOSIS — R63.6 UNDERWEIGHT: ICD-10-CM

## 2024-10-28 DIAGNOSIS — R06.02 SHORTNESS OF BREATH: ICD-10-CM

## 2024-10-28 RX ORDER — OMEPRAZOLE 40 MG/1
40 CAPSULE, DELAYED RELEASE ORAL DAILY
COMMUNITY

## 2024-10-28 RX ORDER — QUETIAPINE FUMARATE 50 MG/1
50 TABLET, FILM COATED ORAL NIGHTLY
COMMUNITY

## 2024-10-28 RX ORDER — METOPROLOL SUCCINATE 50 MG/1
50 TABLET, EXTENDED RELEASE ORAL DAILY
Qty: 90 TABLET | Refills: 2 | Status: SHIPPED | OUTPATIENT
Start: 2024-10-28

## 2024-10-28 RX ORDER — ROSUVASTATIN CALCIUM 10 MG/1
10 TABLET, COATED ORAL DAILY
Qty: 90 TABLET | Refills: 2 | Status: SHIPPED | OUTPATIENT
Start: 2024-10-28

## 2024-10-28 RX ORDER — SACUBITRIL AND VALSARTAN 24; 26 MG/1; MG/1
TABLET, FILM COATED ORAL
Qty: 90 TABLET | Refills: 3 | Status: SHIPPED | OUTPATIENT
Start: 2024-10-28

## 2024-10-28 RX ORDER — ROFLUMILAST 500 UG/1
500 TABLET ORAL DAILY
COMMUNITY

## 2024-10-28 RX ORDER — VERICIGUAT 2.5 MG/1
2.5 TABLET, FILM COATED ORAL DAILY
Qty: 90 TABLET | Refills: 2 | Status: SHIPPED | OUTPATIENT
Start: 2024-10-28

## 2024-10-28 NOTE — PROGRESS NOTES
Chief Complaint   Patient presents with    Med Refill     30 day refills on cardiac medications to [facility]. No med list brought verbally went over medication with pt    Follow-up     Cardiac management pt denies having any soa, chest pains dizziness or palpitation. Last labs in chart from April 2024       Cardiac Complaints  none      Subjective   Fredrick Henao is a 54 y.o. male with with HTN, LV dysfunction with CHF class II systolic dysfunction, COPD, and murmur. He was sent to PCP about 3-4 months prior to his consult and his BP was elevated at more than 200 SBP and . He was advised at consult to have stress testing, echo,  and carotid US.  Carotid US showed no lesions bilaterally, echo showed normal LV function with no significant valve concerns and stress was not completed. It appears after consult, he had a great deal of issues with HTN and medication was increased and changed multiple times, lastly on candesartan 32mg daily, then later changed to benicar. Repeat stress 9/2021 done as a treadmill stress was inconclusive as he failed to reach target HR. Echo done 9/2023 showed EF 65%, mild MR, and RVSP 39mmHG. He then went to the hospital multiple times with SOA. After the third visit, patient was admitted. He was noted to have flu. Cardiac cath was done for elevated troponin, normal coronaries were noted and EF of 50%. He was then intubated in the hospital for SOA, treated for pneumonia and echo done. Echo done showed EF of 35%, ARB changed to entresto and farxiga added. Echo repeated showed EF improved to 56-60%.     He comes today for follow up and admits to having a hard time keeping his BP up. He states he continues to lose weight and does not have much of an appetite. He states he is slowly building back his strength. SOA is improved. He is using oxygen only as needed. CP, palpitations, dizziness, and syncope are denied. He remains tobacco free. No med list available, requesting refills, but  he can not recall current meds.            Cardiac History  Past Surgical History:   Procedure Laterality Date    CARDIAC CATHETERIZATION  03/30/2024    - Normal coronaries. EF 50%. anteroapical WMA    CARDIOVASCULAR STRESS TEST  09/27/2021    R.Stress- 5 Min. 40 Secs. 7.00 METS. 80% THR. BP- 135/60. Inconclusive test    EAR TUBE REMOVAL Left     ECHO - CONVERTED  03/03/2020    EF 60%. Mild MR.    ECHO - CONVERTED  09/14/2023    EF 65%. Mild MR. RVSP- 39 mmHg    ECHO - CONVERTED  03/31/2024    @ [facility].- TLS. EF 35%. Mild MR. RVSP- 36 mmHg    ECHO - CONVERTED  04/25/2024    EF 60%. Trace MR. RVSP- 33 mmHg    ORIF FEMUR FRACTURE Left     US CAROTID UNILATERAL  03/03/2020    No sig lesions       Current Outpatient Medications   Medication Sig Dispense Refill    albuterol sulfate  (90 Base) MCG/ACT inhaler ProAir HFA 90 mcg/actuation aerosol inhaler      budesonide-formoterol (SYMBICORT) 160-4.5 MCG/ACT inhaler Inhale 2 puffs 2 (Two) Times a Day.      buprenorphine-naloxone (SUBOXONE) 8-2 MG per SL tablet Place 2 tablets under the tongue Daily.      famotidine (PEPCID) 20 MG tablet 1 tablet.      metoprolol succinate XL (TOPROL-XL) 50 MG 24 hr tablet Take 1 tablet by mouth Daily. 90 tablet 2    O2 (OXYGEN) Inhale 2 L/min 1 (One) Time.      omeprazole (priLOSEC) 40 MG capsule Take 1 capsule by mouth Daily.      QUEtiapine (SEROquel) 50 MG tablet Take 1 tablet by mouth Every Night.      roflumilast (DALIRESP) 500 MCG tablet tablet Take 1 tablet by mouth Daily.      rosuvastatin (CRESTOR) 10 MG tablet Take 1 tablet by mouth Daily. 90 tablet 2    sacubitril-valsartan (Entresto) 24-26 MG tablet 1/2 tablet twice a day 90 tablet 3    tiotropium (SPIRIVA) 18 MCG per inhalation capsule Place 1 capsule into inhaler and inhale Daily.      Vericiguat (Verquvo) 2.5 MG tablet Take 1 tablet by mouth Daily. 90 tablet 2     No current facility-administered medications for this visit.       Other    Past Medical  "History:   Diagnosis Date    Back pain     Lumbar    COPD (chronic obstructive pulmonary disease)     Heart murmur     History of ear surgery     Hypertension     Leg fracture     s/p steel daniela, then later removed       Social History     Socioeconomic History    Marital status:    Tobacco Use    Smoking status: Former     Current packs/day: 1.50     Average packs/day: 1.5 packs/day for 38.0 years (57.0 ttl pk-yrs)     Types: Cigarettes    Smokeless tobacco: Never   Vaping Use    Vaping status: Never Used   Substance and Sexual Activity    Alcohol use: Never    Drug use: Never    Sexual activity: Defer       Family History   Problem Relation Age of Onset    Diabetes Mother     Cancer Father     Heart attack Father         MI at early age,  at 55 with cancer, MI prior to t    Heart attack Sister         MI at 40, s/p stenting    No Known Problems Brother     Heart disease Sister         open heart due to \" hole in her heart    Cancer Sister     No Known Problems Sister     No Known Problems Sister     No Known Problems Daughter     No Known Problems Daughter     No Known Problems Son        Review of Systems   Constitutional: Negative for malaise/fatigue and night sweats.   Cardiovascular:  Negative for chest pain, claudication, dyspnea on exertion, irregular heartbeat, leg swelling, near-syncope, orthopnea, palpitations and syncope.   Respiratory:  Negative for cough, shortness of breath and wheezing.    Musculoskeletal:  Negative for back pain, joint pain and stiffness.   Gastrointestinal:  Negative for anorexia, heartburn, nausea and vomiting.   Genitourinary:  Negative for dysuria, hematuria, hesitancy and nocturia.   Neurological:  Negative for dizziness, headaches and light-headedness.   Psychiatric/Behavioral:  Negative for depression and memory loss. The patient is not nervous/anxious.            Objective     /60 (BP Location: Left arm, Patient Position: Sitting, Cuff Size: Adult)   Pulse " "97   Resp 18   Ht 172.7 cm (67.99\")   Wt 44.5 kg (98 lb)   BMI 14.91 kg/m²     Constitutional:       Appearance: Frail.   Eyes:      Pupils: Pupils are equal, round, and reactive to light.   HENT:      Nose: Nose normal.   Pulmonary:      Effort: Pulmonary effort is normal.      Breath sounds: Rhonchi present.   Cardiovascular:      PMI at left midclavicular line. Normal rate. Regular rhythm.      Murmurs: There is a systolic murmur.   Abdominal:      Palpations: Abdomen is soft.   Musculoskeletal: Normal range of motion.      Cervical back: Normal range of motion and neck supple. Skin:     General: Skin is warm and dry.   Neurological:      Mental Status: Alert.         Procedures         Diagnoses and all orders for this visit:    1. Essential hypertension (Primary)    2. Shortness of breath  -     Vericiguat (Verquvo) 2.5 MG tablet; Take 1 tablet by mouth Daily.  Dispense: 90 tablet; Refill: 2    3. Acute on chronic diastolic CHF (congestive heart failure)  -     Vericiguat (Verquvo) 2.5 MG tablet; Take 1 tablet by mouth Daily.  Dispense: 90 tablet; Refill: 2    4. COPD with acute exacerbation    5. Former smoker    6. Weight loss    7. Underweight    Other orders  -     Cancel: ECG 12 Lead  -     sacubitril-valsartan (Entresto) 24-26 MG tablet; 1/2 tablet twice a day  Dispense: 90 tablet; Refill: 3  -     metoprolol succinate XL (TOPROL-XL) 50 MG 24 hr tablet; Take 1 tablet by mouth Daily.  Dispense: 90 tablet; Refill: 2  -     rosuvastatin (CRESTOR) 10 MG tablet; Take 1 tablet by mouth Daily.  Dispense: 90 tablet; Refill: 2             HTN: BP remains low normal. Will decrease entresto to 1/2 table BID. Increase BB to 50mg daily. Continue verquvo. Limited sodium.    CHF class II systolic dysfunction: Continue with entresto, BB, and verquvo. Patient is not sure he is taking diuretic therapy. We will call his wife and pharm to verify. Encouraged to limit sodium. Edema/SOA denied.    COPD: Using oxygen as " needed. Followed by specialty. Using inhaler therapy.     Former smoker: Has not smoked since April! Praised for his efforts.     Weight loss: Continues to be underweight. BMI only 14. Patient encouraged to discuss supplementation with your office.    Refills per request?     6 month follow up urged, sooner if needed.           Problems Addressed this Visit          Cardiac and Vasculature    Essential hypertension - Primary    Relevant Medications    metoprolol succinate XL (TOPROL-XL) 50 MG 24 hr tablet    Acute on chronic diastolic CHF (congestive heart failure)    Relevant Medications    sacubitril-valsartan (Entresto) 24-26 MG tablet    metoprolol succinate XL (TOPROL-XL) 50 MG 24 hr tablet    Vericiguat (Verquvo) 2.5 MG tablet       Endocrine and Metabolic    Weight loss       Pulmonary and Pneumonias    Shortness of breath    Relevant Medications    Vericiguat (Verquvo) 2.5 MG tablet     Other Visit Diagnoses       COPD with acute exacerbation        Relevant Medications    roflumilast (DALIRESP) 500 MCG tablet tablet    Former smoker        Underweight              Diagnoses         Codes Comments    Essential hypertension    -  Primary ICD-10-CM: I10  ICD-9-CM: 401.9     Shortness of breath     ICD-10-CM: R06.02  ICD-9-CM: 786.05     Acute on chronic diastolic CHF (congestive heart failure)     ICD-10-CM: I50.33  ICD-9-CM: 428.33, 428.0     COPD with acute exacerbation     ICD-10-CM: J44.1  ICD-9-CM: 491.21     Former smoker     ICD-10-CM: Z87.891  ICD-9-CM: V15.82     Weight loss     ICD-10-CM: R63.4  ICD-9-CM: 783.21     Underweight     ICD-10-CM: R63.6  ICD-9-CM: 783.22                      Electronically signed by Juana Do, TRAM October 28, 2024 16:43 EDT

## 2024-11-27 RX ORDER — ROFLUMILAST 500 UG/1
500 TABLET ORAL DAILY
Qty: 30 TABLET | OUTPATIENT
Start: 2024-11-27

## 2024-11-27 NOTE — TELEPHONE ENCOUNTER
Caller: FARRAREMA    Relationship: Emergency Contact    Best call back number: [phone]    Requested Prescriptions:   Requested Prescriptions     Pending Prescriptions Disp Refills    roflumilast (DALIRESP) 500 MCG tablet tablet 30 tablet      Sig: Take 1 tablet by mouth Daily.        Pharmacy where request should be sent: [address] [phone]  - [phone]     Last office visit with prescribing clinician: 10/28/2024   Last telemedicine visit with prescribing clinician: Visit date not found   Next office visit with prescribing clinician: 5/7/2025         Does the patient have less than a 3 day supply:  [x] Yes  [] No    Would you like a call back once the refill request has been completed: [x] Yes [] No    If the office needs to give you a call back, can they leave a voicemail: [x] Yes [] No    Sami Mae Rep   11/27/24 09:48 EST

## 2024-12-02 RX ORDER — ROSUVASTATIN CALCIUM 10 MG/1
10 TABLET, COATED ORAL DAILY
Qty: 90 TABLET | Refills: 2 | OUTPATIENT
Start: 2024-12-02

## 2024-12-02 NOTE — TELEPHONE ENCOUNTER
Caller: EMA FARRAR    Relationship: Emergency Contact    Best call back number: [phone]    Requested Prescriptions:   Requested Prescriptions     Pending Prescriptions Disp Refills    rosuvastatin (CRESTOR) 10 MG tablet 90 tablet 2     Sig: Take 1 tablet by mouth Daily.        Pharmacy where request should be sent: [address] [phone]  - [phone]     Last office visit with prescribing clinician: 10/28/2024   Last telemedicine visit with prescribing clinician: Visit date not found   Next office visit with prescribing clinician: 5/7/2025     Additional details provided by patient:     Does the patient have less than a 3 day supply:  [x] Yes  [] No    Would you like a call back once the refill request has been completed: [] Yes [x] No    If the office needs to give you a call back, can they leave a voicemail: [] Yes [x] No    Sami Cowan Rep   12/02/24 08:49 EST

## 2025-05-08 DIAGNOSIS — R06.02 SHORTNESS OF BREATH: ICD-10-CM

## 2025-05-08 DIAGNOSIS — I50.33 ACUTE ON CHRONIC DIASTOLIC CHF (CONGESTIVE HEART FAILURE): ICD-10-CM

## 2025-05-08 RX ORDER — VERICIGUAT 2.5 MG/1
2.5 TABLET, FILM COATED ORAL DAILY
Qty: 90 TABLET | Refills: 2 | Status: SHIPPED | OUTPATIENT
Start: 2025-05-08 | End: 2025-05-12 | Stop reason: SDUPTHER

## 2025-05-08 NOTE — TELEPHONE ENCOUNTER
Caller: Fredrick Henao    Relationship: Self    Best call back number: [phone]    Requested Prescriptions:   Requested Prescriptions     Pending Prescriptions Disp Refills    Vericiguat (Verquvo) 2.5 MG tablet 90 tablet 2     Sig: Take 1 tablet by mouth Daily.        Pharmacy where request should be sent: [address] [phone]  - [phone]     Last office visit with prescribing clinician: 10/28/2024   Last telemedicine visit with prescribing clinician: Visit date not found   Next office visit with prescribing clinician: Visit date not found     Additional details provided by patient: SAYS IT NEEDS TO BE PRIOR AUTH    Does the patient have less than a 3 day supply:  [x] Yes  [] No    Would you like a call back once the refill request has been completed: [x] Yes [] No    If the office needs to give you a call back, can they leave a voicemail: [x] Yes [] No    Sami Mae Rep   05/08/25 11:45 EDT

## 2025-05-12 ENCOUNTER — OFFICE VISIT (OUTPATIENT)
Dept: CARDIOLOGY | Facility: CLINIC | Age: 55
End: 2025-05-12
Payer: COMMERCIAL

## 2025-05-12 VITALS
HEART RATE: 76 BPM | SYSTOLIC BLOOD PRESSURE: 96 MMHG | DIASTOLIC BLOOD PRESSURE: 58 MMHG | HEIGHT: 68 IN | BODY MASS INDEX: 15.01 KG/M2 | WEIGHT: 99 LBS

## 2025-05-12 DIAGNOSIS — R00.0 SINUS TACHYCARDIA: ICD-10-CM

## 2025-05-12 DIAGNOSIS — E78.2 MIXED HYPERLIPIDEMIA: ICD-10-CM

## 2025-05-12 DIAGNOSIS — I50.33 ACUTE ON CHRONIC DIASTOLIC CHF (CONGESTIVE HEART FAILURE): ICD-10-CM

## 2025-05-12 DIAGNOSIS — I10 ESSENTIAL HYPERTENSION: Primary | ICD-10-CM

## 2025-05-12 DIAGNOSIS — Z87.891 FORMER SMOKER: ICD-10-CM

## 2025-05-12 DIAGNOSIS — J44.9 COPD MIXED TYPE: ICD-10-CM

## 2025-05-12 DIAGNOSIS — R06.02 SHORTNESS OF BREATH: ICD-10-CM

## 2025-05-12 DIAGNOSIS — R05.8 OTHER COUGH: ICD-10-CM

## 2025-05-12 PROCEDURE — 99214 OFFICE O/P EST MOD 30 MIN: CPT | Performed by: NURSE PRACTITIONER

## 2025-05-12 PROCEDURE — 3074F SYST BP LT 130 MM HG: CPT | Performed by: NURSE PRACTITIONER

## 2025-05-12 PROCEDURE — 3078F DIAST BP <80 MM HG: CPT | Performed by: NURSE PRACTITIONER

## 2025-05-12 RX ORDER — VERICIGUAT 2.5 MG/1
2.5 TABLET, FILM COATED ORAL DAILY
Qty: 90 TABLET | Refills: 2 | Status: SHIPPED | OUTPATIENT
Start: 2025-05-12

## 2025-05-12 RX ORDER — GUAIFENESIN 600 MG/1
1200 TABLET, EXTENDED RELEASE ORAL 2 TIMES DAILY
Qty: 60 TABLET | Refills: 0 | Status: SHIPPED | OUTPATIENT
Start: 2025-05-12

## 2025-05-12 RX ORDER — SACUBITRIL AND VALSARTAN 24; 26 MG/1; MG/1
TABLET, FILM COATED ORAL
Qty: 90 TABLET | Refills: 3 | Status: SHIPPED | OUTPATIENT
Start: 2025-05-12

## 2025-05-12 RX ORDER — METOPROLOL SUCCINATE 50 MG/1
50 TABLET, EXTENDED RELEASE ORAL DAILY
Qty: 90 TABLET | Refills: 2 | Status: SHIPPED | OUTPATIENT
Start: 2025-05-12

## 2025-05-12 RX ORDER — ROSUVASTATIN CALCIUM 10 MG/1
10 TABLET, COATED ORAL DAILY
Qty: 90 TABLET | Refills: 2 | Status: SHIPPED | OUTPATIENT
Start: 2025-05-12

## 2025-05-12 NOTE — PROGRESS NOTES
"Chief Complaint   Patient presents with    Follow-up     For cardiac management, reports doing well except for having a \" bad cough\" productive, thick, clear secretions.     Medication Problem     Pt reports having problems getting the Verquvo filled due to trying to get it authorized. Will check on PA    Pt did not bring medication or list. Our list updated after pt verbally went over with me.     Labs     PCP checked within the past month.    Med Refill     Refills needed on cardiac meds. [facility].        Cardiac Complaints  dyspnea      Subjective   Fredrick Henao is a 54 y.o. male with HTN, LV dysfunction with CHF class II systolic dysfunction, COPD, and murmur. He was sent to PCP about 3-4 months prior to his consult and his BP was elevated at more than 200 SBP and . He was advised at consult to have stress testing, echo,  and carotid US.  Carotid US showed no lesions bilaterally, echo showed normal LV function with no significant valve concerns and stress was not completed. It appears after consult, he had a great deal of issues with HTN and medication was increased and changed multiple times, lastly on candesartan 32mg daily, then later changed to benicar. Repeat stress 9/2021 done as a treadmill stress was inconclusive as he failed to reach target HR. Echo done 9/2023 showed EF 65%, mild MR, and RVSP 39mmHG. He then went to the hospital multiple times with SOA. After the third visit, patient was admitted. He was noted to have flu. Cardiac cath was done for elevated troponin, normal coronaries were noted and EF of 50%. He was then intubated in the hospital for SOA, treated for pneumonia and echo done. Echo done showed EF of 35%, ARB changed to entresto and farxiga added. Echo repeated showed EF improved to 56-60%.     He comes today for follow up and new cardiac concerns are denied. No CP, dizziness, palpitations, or syncope noted. He does have some SOA which he attributes to his " COPD/past smoking. Patient admits he sometimes has a thick/productive cough in the AM. Recent adjustment to inhaler therapy reported by pulm. Labs with PCP, checked recently, not available for review. Refills requested.            Cardiac History  Past Surgical History:   Procedure Laterality Date    CARDIAC CATHETERIZATION  03/30/2024    - Normal coronaries. EF 50%. anteroapical WMA    CARDIOVASCULAR STRESS TEST  09/27/2021    R.Stress- 5 Min. 40 Secs. 7.00 METS. 80% THR. BP- 135/60. Inconclusive test    EAR TUBE REMOVAL Left     ECHO - CONVERTED  03/03/2020    EF 60%. Mild MR.    ECHO - CONVERTED  09/14/2023    EF 65%. Mild MR. RVSP- 39 mmHg    ECHO - CONVERTED  03/31/2024    @ [facility].- TLS. EF 35%. Mild MR. RVSP- 36 mmHg    ECHO - CONVERTED  04/25/2024    EF 60%. Trace MR. RVSP- 33 mmHg    ORIF FEMUR FRACTURE Left     US CAROTID UNILATERAL  03/03/2020    No sig lesions       Current Outpatient Medications   Medication Sig Dispense Refill    albuterol sulfate  (90 Base) MCG/ACT inhaler ProAir HFA 90 mcg/actuation aerosol inhaler      budesonide-formoterol (SYMBICORT) 160-4.5 MCG/ACT inhaler Inhale 2 puffs 2 (Two) Times a Day.      buprenorphine-naloxone (SUBOXONE) 8-2 MG per SL tablet Place 2 tablets under the tongue Daily.      famotidine (PEPCID) 20 MG tablet 1 tablet Daily.      metoprolol succinate XL (TOPROL-XL) 50 MG 24 hr tablet Take 1 tablet by mouth Daily. 90 tablet 2    O2 (OXYGEN) Inhale 2 L/min 1 (One) Time.      omeprazole (priLOSEC) 40 MG capsule Take 1 capsule by mouth Daily.      roflumilast (DALIRESP) 500 MCG tablet tablet Take 1 tablet by mouth Daily.      rosuvastatin (CRESTOR) 10 MG tablet Take 1 tablet by mouth Daily. 90 tablet 2    sacubitril-valsartan (Entresto) 24-26 MG tablet 1/2 tablet twice a day 90 tablet 3    Vericiguat (Verquvo) 2.5 MG tablet Take 1 tablet by mouth Daily. 90 tablet 2    guaiFENesin (Mucinex) 600 MG 12 hr tablet Take 2 tablets by mouth 2 (Two)  "Times a Day. 60 tablet 0     No current facility-administered medications for this visit.       Other    Past Medical History:   Diagnosis Date    Back pain     Lumbar    COPD (chronic obstructive pulmonary disease)     Heart murmur     History of ear surgery     Hypertension     Leg fracture     s/p steel daniela, then later removed       Social History     Socioeconomic History    Marital status:    Tobacco Use    Smoking status: Former     Current packs/day: 1.50     Average packs/day: 1.5 packs/day for 38.0 years (57.0 ttl pk-yrs)     Types: Cigarettes    Smokeless tobacco: Never   Vaping Use    Vaping status: Never Used   Substance and Sexual Activity    Alcohol use: Never    Drug use: Never    Sexual activity: Defer       Family History   Problem Relation Age of Onset    Diabetes Mother     Cancer Father     Heart attack Father         MI at early age,  at 55 with cancer, MI prior to t    Heart attack Sister         MI at 40, s/p stenting    No Known Problems Brother     Heart disease Sister         open heart due to \" hole in her heart    Cancer Sister     No Known Problems Sister     No Known Problems Sister     No Known Problems Daughter     No Known Problems Daughter     No Known Problems Son        Review of Systems   Constitutional: Negative for malaise/fatigue and night sweats.   Cardiovascular:  Positive for dyspnea on exertion. Negative for chest pain, claudication, irregular heartbeat, leg swelling, near-syncope, orthopnea, palpitations and syncope.   Respiratory:  Positive for cough and shortness of breath.    Musculoskeletal:  Positive for stiffness. Negative for back pain and joint pain.   Gastrointestinal:  Negative for anorexia, heartburn, nausea and vomiting.   Genitourinary:  Negative for dysuria, hematuria, hesitancy and nocturia.   Neurological:  Negative for dizziness, light-headedness and loss of balance.   Psychiatric/Behavioral:  Negative for depression and memory loss. The " "patient is not nervous/anxious.            Objective     BP 96/58   Pulse 76   Ht 172.7 cm (68\")   Wt 44.9 kg (99 lb)   BMI 15.05 kg/m²     Constitutional:       Appearance: Not in distress.   Eyes:      Pupils: Pupils are equal, round, and reactive to light.   HENT:      Nose: Nose normal.   Pulmonary:      Effort: Pulmonary effort is normal.      Breath sounds: Rhonchi present.   Cardiovascular:      PMI at left midclavicular line. Normal rate. Regular rhythm.      Murmurs: There is a systolic murmur.   Abdominal:      Palpations: Abdomen is soft.   Musculoskeletal: Normal range of motion.      Cervical back: Normal range of motion and neck supple. Skin:     General: Skin is warm and dry.   Neurological:      Mental Status: Alert.         Procedures         Diagnoses and all orders for this visit:    1. Essential hypertension (Primary)    2. Shortness of breath  -     Vericiguat (Verquvo) 2.5 MG tablet; Take 1 tablet by mouth Daily.  Dispense: 90 tablet; Refill: 2    3. Acute on chronic diastolic CHF (congestive heart failure)  -     Vericiguat (Verquvo) 2.5 MG tablet; Take 1 tablet by mouth Daily.  Dispense: 90 tablet; Refill: 2    4. Sinus tachycardia    5. Mixed hyperlipidemia    6. Former smoker    7. COPD mixed type    8. Other cough    Other orders  -     guaiFENesin (Mucinex) 600 MG 12 hr tablet; Take 2 tablets by mouth 2 (Two) Times a Day.  Dispense: 60 tablet; Refill: 0  -     metoprolol succinate XL (TOPROL-XL) 50 MG 24 hr tablet; Take 1 tablet by mouth Daily.  Dispense: 90 tablet; Refill: 2  -     rosuvastatin (CRESTOR) 10 MG tablet; Take 1 tablet by mouth Daily.  Dispense: 90 tablet; Refill: 2  -     sacubitril-valsartan (Entresto) 24-26 MG tablet; 1/2 tablet twice a day  Dispense: 90 tablet; Refill: 3             HTN: BP remains low normal, similar to prior.  Continue low dose entresto and BB at same. Continue verquvo. Limit sodium.     CHF class II systolic dysfunction: Continue with entresto, " BB, and verquvo. Encouraged to limit sodium. Edema/worsening SOA denied.    Tachycardia: HR at goal. Continue BB at same.    Hyperlipidemia: Taking statin therapy, tolerates well. FLP with you. Continue crestor at same.     COPD: Using oxygen as needed. Followed by specialty. Using inhaler therapy. Encouraged to discuss pulmonary for management. Unsure about CT of the chest. Add mucinex to current.     Former smoker: He continues to be tobacco free. Praised for his continued effort.     Weight loss: Continues to be underweight. BMI only 15. Patient encouraged to discuss supplementation with your office. Adequate protein intake urged.     Refills per request?      6 month follow up urged, sooner if needed.                     Problems Addressed this Visit          Cardiac and Vasculature    Essential hypertension - Primary    Relevant Medications    metoprolol succinate XL (TOPROL-XL) 50 MG 24 hr tablet    Acute on chronic diastolic CHF (congestive heart failure)    Relevant Medications    metoprolol succinate XL (TOPROL-XL) 50 MG 24 hr tablet    sacubitril-valsartan (Entresto) 24-26 MG tablet    Vericiguat (Verquvo) 2.5 MG tablet       Pulmonary and Pneumonias    Shortness of breath    Relevant Medications    Vericiguat (Verquvo) 2.5 MG tablet     Other Visit Diagnoses         Sinus tachycardia          Mixed hyperlipidemia        Relevant Medications    rosuvastatin (CRESTOR) 10 MG tablet      Former smoker          COPD mixed type        Relevant Medications    guaiFENesin (Mucinex) 600 MG 12 hr tablet      Other cough              Diagnoses         Codes Comments      Essential hypertension    -  Primary ICD-10-CM: I10  ICD-9-CM: 401.9       Shortness of breath     ICD-10-CM: R06.02  ICD-9-CM: 786.05       Acute on chronic diastolic CHF (congestive heart failure)     ICD-10-CM: I50.33  ICD-9-CM: 428.33, 428.0       Sinus tachycardia     ICD-10-CM: R00.0  ICD-9-CM: 427.89       Mixed hyperlipidemia     ICD-10-CM:  E78.2  ICD-9-CM: 272.2       Former smoker     ICD-10-CM: Z87.891  ICD-9-CM: V15.82       COPD mixed type     ICD-10-CM: J44.9  ICD-9-CM: 496       Other cough     ICD-10-CM: R05.8  ICD-9-CM: 786.2                       Electronically signed by Juana oD, APRN May 12, 2025 14:55 EDT

## 2025-05-19 ENCOUNTER — TELEPHONE (OUTPATIENT)
Dept: CARDIOLOGY | Facility: CLINIC | Age: 55
End: 2025-05-19
Payer: COMMERCIAL

## 2025-05-19 NOTE — TELEPHONE ENCOUNTER
Caller: EMA FARRAR    Relationship: Emergency Contact    Best call back number: [phone]    What is the best time to reach you: ANY    Who are you requesting to speak with (clinical staff, provider,  specific staff member): CLINICAL    Do you know the name of the person who called: N/A    What was the call regarding: PATIENTS WIFE, EMA FARRAR CALLED IN TO SPEAK TO TRAM ZARATE ABOUT PATIENTS VERICIGUAT 2.5 MG DAILY. PATIENT HAS NOT BEEN ABLE TO GET HIS MEDICATION. PATIENTS WIFE, EMA FARRAR IS WANTING TO KNOW IF TRAM ZARATE CAN HELP IN ANY WAY.    Is it okay if the provider responds through MyChart: CALL

## 2025-06-17 ENCOUNTER — PRIOR AUTHORIZATION (OUTPATIENT)
Dept: CARDIOLOGY | Facility: CLINIC | Age: 55
End: 2025-06-17
Payer: COMMERCIAL

## 2025-06-17 NOTE — TELEPHONE ENCOUNTER
Received fax from [employer] about appeal that was done on Verquvo 2.5 mg. It was approved from 05/19/25 to 05/18/26.    Appeal Case# [id number]

## 2025-08-08 RX ORDER — ROSUVASTATIN CALCIUM 10 MG/1
10 TABLET, COATED ORAL DAILY
Qty: 90 TABLET | Refills: 2 | OUTPATIENT
Start: 2025-08-08

## 2025-08-08 RX ORDER — SACUBITRIL AND VALSARTAN 24; 26 MG/1; MG/1
TABLET, FILM COATED ORAL
Qty: 90 TABLET | Refills: 3 | OUTPATIENT
Start: 2025-08-08